# Patient Record
Sex: FEMALE | Race: WHITE | NOT HISPANIC OR LATINO | Employment: OTHER | URBAN - NONMETROPOLITAN AREA
[De-identification: names, ages, dates, MRNs, and addresses within clinical notes are randomized per-mention and may not be internally consistent; named-entity substitution may affect disease eponyms.]

---

## 2018-06-20 ENCOUNTER — TRANSCRIBE ORDERS (OUTPATIENT)
Dept: ADMINISTRATIVE | Facility: HOSPITAL | Age: 73
End: 2018-06-20

## 2018-06-20 DIAGNOSIS — Z00.00 ROUTINE GENERAL MEDICAL EXAMINATION AT A HEALTH CARE FACILITY: Primary | ICD-10-CM

## 2018-06-21 ENCOUNTER — TRANSCRIBE ORDERS (OUTPATIENT)
Dept: ADMINISTRATIVE | Facility: HOSPITAL | Age: 73
End: 2018-06-21

## 2018-06-21 DIAGNOSIS — F17.200 TOBACCO USE DISORDER: Primary | ICD-10-CM

## 2018-06-27 ENCOUNTER — APPOINTMENT (OUTPATIENT)
Dept: BONE DENSITY | Facility: HOSPITAL | Age: 73
End: 2018-06-27

## 2018-06-27 ENCOUNTER — HOSPITAL ENCOUNTER (OUTPATIENT)
Dept: CT IMAGING | Facility: HOSPITAL | Age: 73
Discharge: HOME | End: 2018-06-27
Admitting: FAMILY MEDICINE

## 2018-06-27 DIAGNOSIS — F17.200 TOBACCO USE DISORDER: ICD-10-CM

## 2018-06-27 DIAGNOSIS — Z00.00 ROUTINE GENERAL MEDICAL EXAMINATION AT A HEALTH CARE FACILITY: ICD-10-CM

## 2018-06-27 LAB
CREAT BLD-MCNC: 0.7 MG/DL (ref 0.6–1.3)
GFR SERPL CREATININE-BSD FRML MDRD: 82 ML/MIN/1.73

## 2018-06-27 PROCEDURE — 82565 ASSAY OF CREATININE: CPT | Performed by: RADIOLOGY

## 2018-06-27 PROCEDURE — 77080 DXA BONE DENSITY AXIAL: CPT

## 2018-06-27 PROCEDURE — 25010000002 IOPAMIDOL 61 % SOLUTION: Performed by: FAMILY MEDICINE

## 2018-06-27 PROCEDURE — 71260 CT THORAX DX C+: CPT

## 2018-06-27 RX ADMIN — IOPAMIDOL 100 ML: 612 INJECTION, SOLUTION INTRAVENOUS at 14:30

## 2019-01-02 ENCOUNTER — HOSPITAL ENCOUNTER (EMERGENCY)
Facility: HOSPITAL | Age: 74
Discharge: HOME | End: 2019-01-02
Attending: EMERGENCY MEDICINE | Admitting: EMERGENCY MEDICINE

## 2019-01-02 ENCOUNTER — APPOINTMENT (OUTPATIENT)
Dept: GENERAL RADIOLOGY | Facility: HOSPITAL | Age: 74
End: 2019-01-02

## 2019-01-02 VITALS
RESPIRATION RATE: 18 BRPM | WEIGHT: 132 LBS | OXYGEN SATURATION: 90 % | HEIGHT: 66 IN | BODY MASS INDEX: 21.21 KG/M2 | SYSTOLIC BLOOD PRESSURE: 156 MMHG | TEMPERATURE: 97.5 F | DIASTOLIC BLOOD PRESSURE: 76 MMHG | HEART RATE: 82 BPM

## 2019-01-02 DIAGNOSIS — J44.1 COPD EXACERBATION (HCC): Primary | ICD-10-CM

## 2019-01-02 LAB
ALBUMIN SERPL-MCNC: 4.7 G/DL (ref 3.5–5)
ALBUMIN/GLOB SERPL: 1.6 G/DL (ref 1–2)
ALP SERPL-CCNC: 77 U/L (ref 38–126)
ALT SERPL W P-5'-P-CCNC: 23 U/L (ref 13–69)
ANION GAP SERPL CALCULATED.3IONS-SCNC: 13.2 MMOL/L (ref 10–20)
AST SERPL-CCNC: 21 U/L (ref 15–46)
BASOPHILS # BLD AUTO: 0.12 10*3/MM3 (ref 0–0.2)
BASOPHILS NFR BLD AUTO: 1.1 % (ref 0–2.5)
BILIRUB SERPL-MCNC: 0.6 MG/DL (ref 0.2–1.3)
BUN BLD-MCNC: 7 MG/DL (ref 7–20)
BUN/CREAT SERPL: 10 (ref 7.1–23.5)
CALCIUM SPEC-SCNC: 9.5 MG/DL (ref 8.4–10.2)
CHLORIDE SERPL-SCNC: 98 MMOL/L (ref 98–107)
CO2 SERPL-SCNC: 28 MMOL/L (ref 26–30)
CREAT BLD-MCNC: 0.7 MG/DL (ref 0.6–1.3)
DEPRECATED RDW RBC AUTO: 41 FL (ref 37–54)
EOSINOPHIL # BLD AUTO: 0.86 10*3/MM3 (ref 0–0.7)
EOSINOPHIL NFR BLD AUTO: 8 % (ref 0–7)
ERYTHROCYTE [DISTWIDTH] IN BLOOD BY AUTOMATED COUNT: 12.7 % (ref 11.5–14.5)
GFR SERPL CREATININE-BSD FRML MDRD: 82 ML/MIN/1.73
GLOBULIN UR ELPH-MCNC: 2.9 GM/DL
GLUCOSE BLD-MCNC: 101 MG/DL (ref 74–98)
HCT VFR BLD AUTO: 42.5 % (ref 37–47)
HGB BLD-MCNC: 14.6 G/DL (ref 12–16)
HOLD SPECIMEN: NORMAL
HOLD SPECIMEN: NORMAL
IMM GRANULOCYTES # BLD AUTO: 0.04 10*3/MM3 (ref 0–0.06)
IMM GRANULOCYTES NFR BLD AUTO: 0.4 % (ref 0–0.6)
LYMPHOCYTES # BLD AUTO: 3.51 10*3/MM3 (ref 0.6–3.4)
LYMPHOCYTES NFR BLD AUTO: 32.6 % (ref 10–50)
MCH RBC QN AUTO: 30.5 PG (ref 27–31)
MCHC RBC AUTO-ENTMCNC: 34.4 G/DL (ref 30–37)
MCV RBC AUTO: 88.7 FL (ref 81–99)
MONOCYTES # BLD AUTO: 0.81 10*3/MM3 (ref 0–0.9)
MONOCYTES NFR BLD AUTO: 7.5 % (ref 0–12)
NEUTROPHILS # BLD AUTO: 5.43 10*3/MM3 (ref 2–6.9)
NEUTROPHILS NFR BLD AUTO: 50.4 % (ref 37–80)
NRBC BLD AUTO-RTO: 0 /100 WBC (ref 0–0)
NT-PROBNP SERPL-MCNC: 1540 PG/ML (ref 0–125)
PLATELET # BLD AUTO: 376 10*3/MM3 (ref 130–400)
PMV BLD AUTO: 10.1 FL (ref 6–12)
POTASSIUM BLD-SCNC: 4.2 MMOL/L (ref 3.5–5.1)
PROT SERPL-MCNC: 7.6 G/DL (ref 6.3–8.2)
RBC # BLD AUTO: 4.79 10*6/MM3 (ref 4.2–5.4)
SODIUM BLD-SCNC: 135 MMOL/L (ref 137–145)
TROPONIN I SERPL-MCNC: 0.02 NG/ML (ref 0–0.03)
WBC NRBC COR # BLD: 10.77 10*3/MM3 (ref 4.8–10.8)
WHOLE BLOOD HOLD SPECIMEN: NORMAL
WHOLE BLOOD HOLD SPECIMEN: NORMAL

## 2019-01-02 PROCEDURE — 94640 AIRWAY INHALATION TREATMENT: CPT

## 2019-01-02 PROCEDURE — 99283 EMERGENCY DEPT VISIT LOW MDM: CPT

## 2019-01-02 PROCEDURE — 84484 ASSAY OF TROPONIN QUANT: CPT | Performed by: EMERGENCY MEDICINE

## 2019-01-02 PROCEDURE — 96374 THER/PROPH/DIAG INJ IV PUSH: CPT

## 2019-01-02 PROCEDURE — 85025 COMPLETE CBC W/AUTO DIFF WBC: CPT | Performed by: EMERGENCY MEDICINE

## 2019-01-02 PROCEDURE — 71046 X-RAY EXAM CHEST 2 VIEWS: CPT

## 2019-01-02 PROCEDURE — 94799 UNLISTED PULMONARY SVC/PX: CPT

## 2019-01-02 PROCEDURE — 83880 ASSAY OF NATRIURETIC PEPTIDE: CPT | Performed by: EMERGENCY MEDICINE

## 2019-01-02 PROCEDURE — 80053 COMPREHEN METABOLIC PANEL: CPT | Performed by: EMERGENCY MEDICINE

## 2019-01-02 PROCEDURE — 93005 ELECTROCARDIOGRAM TRACING: CPT | Performed by: EMERGENCY MEDICINE

## 2019-01-02 PROCEDURE — 25010000002 METHYLPREDNISOLONE PER 40 MG: Performed by: EMERGENCY MEDICINE

## 2019-01-02 RX ORDER — SODIUM CHLORIDE 0.9 % (FLUSH) 0.9 %
10 SYRINGE (ML) INJECTION AS NEEDED
Status: DISCONTINUED | OUTPATIENT
Start: 2019-01-02 | End: 2019-01-02 | Stop reason: HOSPADM

## 2019-01-02 RX ORDER — PREDNISONE 20 MG/1
40 TABLET ORAL DAILY
Qty: 8 TABLET | Refills: 0 | Status: SHIPPED | OUTPATIENT
Start: 2019-01-02 | End: 2019-01-06

## 2019-01-02 RX ORDER — METHYLPREDNISOLONE SODIUM SUCCINATE 40 MG/ML
80 INJECTION, POWDER, LYOPHILIZED, FOR SOLUTION INTRAMUSCULAR; INTRAVENOUS ONCE
Status: COMPLETED | OUTPATIENT
Start: 2019-01-02 | End: 2019-01-02

## 2019-01-02 RX ORDER — BENZONATATE 100 MG/1
100 CAPSULE ORAL ONCE
Status: COMPLETED | OUTPATIENT
Start: 2019-01-02 | End: 2019-01-02

## 2019-01-02 RX ORDER — BENZONATATE 100 MG/1
100 CAPSULE ORAL 3 TIMES DAILY PRN
Qty: 15 CAPSULE | Refills: 0 | Status: SHIPPED | OUTPATIENT
Start: 2019-01-02

## 2019-01-02 RX ORDER — IPRATROPIUM BROMIDE AND ALBUTEROL SULFATE 2.5; .5 MG/3ML; MG/3ML
3 SOLUTION RESPIRATORY (INHALATION) ONCE
Status: COMPLETED | OUTPATIENT
Start: 2019-01-02 | End: 2019-01-02

## 2019-01-02 RX ADMIN — IPRATROPIUM BROMIDE AND ALBUTEROL SULFATE 3 ML: .5; 3 SOLUTION RESPIRATORY (INHALATION) at 18:09

## 2019-01-02 RX ADMIN — METHYLPREDNISOLONE SODIUM SUCCINATE 80 MG: 40 INJECTION, POWDER, FOR SOLUTION INTRAMUSCULAR; INTRAVENOUS at 16:49

## 2019-01-02 RX ADMIN — BENZONATATE 100 MG: 100 CAPSULE, LIQUID FILLED ORAL at 18:47

## 2019-01-02 NOTE — ED PROVIDER NOTES
Subjective   History of Present Illness   73F w/ hx of COPD p/w cough. Pt reports that she had some sinus pressure and congestion 2 weeks ago that has been improving on amoxicillin.  However, she feels as though this is gone into her chest and she has had worsening cough over the last 3 days.  This is worse at night.  She has been using her nebulizer which helps for a couple of hours but then wears off.  Denies any fevers, chills, chest pain, leg swelling, or other specific symptoms.  Not currently on any steroids.    Review of Systems   Respiratory: Positive for cough.    All other systems reviewed and are negative.      Past Medical History:   Diagnosis Date   • COPD (chronic obstructive pulmonary disease) (CMS/[facility])        No Known Allergies    Past Surgical History:   Procedure Laterality Date   • CARPAL TUNNEL RELEASE     • HYSTERECTOMY     • SHOULDER SURGERY     • TRIGGER FINGER RELEASE         History reviewed. No pertinent family history.    Social History     Socioeconomic History   • Marital status: Unknown     Spouse name: Not on file   • Number of children: Not on file   • Years of education: Not on file   • Highest education level: Not on file   Tobacco Use   • Smoking status: Current Every Day Smoker     Types: Cigarettes   Substance and Sexual Activity   • Alcohol use: No     Frequency: Never   • Drug use: No           Objective   Physical Exam   Constitutional: She is oriented to person, place, and time. She appears well-developed and well-nourished.  Non-toxic appearance. She does not appear ill. No distress.   HENT:   Head: Normocephalic.   Mouth/Throat: Oropharynx is clear and moist. No oropharyngeal exudate.   Eyes: Conjunctivae are normal. No scleral icterus.   Neck: Neck supple. No tracheal deviation present.   Cardiovascular: Normal rate, regular rhythm, normal heart sounds and intact distal pulses. Exam reveals no gallop and no friction rub.   No murmur heard.  Pulmonary/Chest: Effort normal.  No stridor. No respiratory distress. She has wheezes (faint end-expiratory). She has no rales. She exhibits no tenderness.   Abdominal: Soft. She exhibits no distension and no mass. There is no tenderness. There is no rebound and no guarding. No hernia.   Musculoskeletal: She exhibits no edema or deformity.   Neurological: She is alert and oriented to person, place, and time.   Skin: Skin is warm and dry. She is not diaphoretic. No erythema. No pallor.   Psychiatric: She has a normal mood and affect. Her behavior is normal.   Nursing note and vitals reviewed.      Procedures           ED Course  ED Course as of 1840   Wed 2019   1709 EKst AVB, NSR w/ HR 76, single PVC, low voltage. No ANGEL?STD. TWI in lead aVL. 0.5mm ANGEL in V5 only. Abnormal EKG. Does not meet STEMI criteria. Interpreted by me.   [AI]      ED Course User Index  [AI] Lorenzo Moffett MD                  [facility]  73-year-old female here with worsening cough for the last 3 days.  Afebrile, vital signs stable.  Lungs sound clear with very faint end expiratory wheezing.  Suspect likely COPD exacerbation versus postviral cough.  We'll send labs, chest x-ray, EKG to evaluate for other causes but these are reassuring, will discharge home on a short course of steroids.    6:40 PM labs show slightly elevated pro BNP.  Otherwise reassuring.  Chest x-ray appears clear.  Patient is remained O2 sat over 80% throughout her stay here.  I suspect that this is likely a COPD exacerbation and will prescribe her Tessalon Perles, steroids, and give strict return to care precautions.  She does have home oxygen already and I encouraged her to use this throughout the day and at night.  Discussed importance of follow-up within the next 1-2 days with her primary care physician.    Final diagnoses:   COPD exacerbation (CMS/[facility])            Lorenzo Moffett MD  19

## 2019-01-13 ENCOUNTER — APPOINTMENT (OUTPATIENT)
Dept: GENERAL RADIOLOGY | Facility: HOSPITAL | Age: 74
End: 2019-01-13

## 2019-01-13 ENCOUNTER — HOSPITAL ENCOUNTER (INPATIENT)
Facility: HOSPITAL | Age: 74
LOS: 4 days | Discharge: ANOTHER HOSPITAL | End: 2019-01-17
Attending: EMERGENCY MEDICINE | Admitting: INTERNAL MEDICINE

## 2019-01-13 DIAGNOSIS — I21.9 MYOCARDIAL INFARCTION, UNSPECIFIED MI TYPE, UNSPECIFIED ARTERY (HCC): Primary | ICD-10-CM

## 2019-01-13 DIAGNOSIS — I21.4 NON-ST ELEVATION (NSTEMI) MYOCARDIAL INFARCTION (HCC): ICD-10-CM

## 2019-01-13 DIAGNOSIS — I50.9 HEART FAILURE, UNSPECIFIED HF CHRONICITY, UNSPECIFIED HEART FAILURE TYPE (HCC): ICD-10-CM

## 2019-01-13 LAB
A-A DO2: ABNORMAL MMHG
ALBUMIN SERPL-MCNC: 4.4 G/DL (ref 3.5–5)
ALBUMIN/GLOB SERPL: 1.6 G/DL (ref 1–2)
ALP SERPL-CCNC: 74 U/L (ref 38–126)
ALT SERPL W P-5'-P-CCNC: 24 U/L (ref 13–69)
ANION GAP SERPL CALCULATED.3IONS-SCNC: 11.2 MMOL/L (ref 10–20)
ARTERIAL PATENCY WRIST A: POSITIVE
AST SERPL-CCNC: 21 U/L (ref 15–46)
ATMOSPHERIC PRESS: 735 MMHG
BACTERIA UR QL AUTO: ABNORMAL /HPF
BASE EXCESS BLDA CALC-SCNC: 2.8 MMOL/L (ref 0–2)
BASOPHILS # BLD AUTO: 0.09 10*3/MM3 (ref 0–0.2)
BASOPHILS NFR BLD AUTO: 1 % (ref 0–2.5)
BDY SITE: ABNORMAL
BILIRUB SERPL-MCNC: 0.8 MG/DL (ref 0.2–1.3)
BILIRUB UR QL STRIP: NEGATIVE
BUN BLD-MCNC: 8 MG/DL (ref 7–20)
BUN/CREAT SERPL: 11.4 (ref 7.1–23.5)
CALCIUM SPEC-SCNC: 8.9 MG/DL (ref 8.4–10.2)
CHLORIDE SERPL-SCNC: 98 MMOL/L (ref 98–107)
CHOLEST SERPL-MCNC: 176 MG/DL (ref 0–199)
CLARITY UR: CLEAR
CO2 SERPL-SCNC: 29 MMOL/L (ref 26–30)
COHGB MFR BLD: 0.8 % (ref 0–2)
COLOR UR: YELLOW
CREAT BLD-MCNC: 0.7 MG/DL (ref 0.6–1.3)
DEPRECATED RDW RBC AUTO: 44.3 FL (ref 37–54)
EOSINOPHIL # BLD AUTO: 0.78 10*3/MM3 (ref 0–0.7)
EOSINOPHIL NFR BLD AUTO: 8.7 % (ref 0–7)
ERYTHROCYTE [DISTWIDTH] IN BLOOD BY AUTOMATED COUNT: 13 % (ref 11.5–14.5)
GAS FLOW AIRWAY: 3 LPM
GFR SERPL CREATININE-BSD FRML MDRD: 82 ML/MIN/1.73
GLOBULIN UR ELPH-MCNC: 2.8 GM/DL
GLUCOSE BLD-MCNC: 143 MG/DL (ref 74–98)
GLUCOSE UR STRIP-MCNC: NEGATIVE MG/DL
HCO3 BLDA-SCNC: 28.5 MMOL/L (ref 22–28)
HCT VFR BLD AUTO: 43.5 % (ref 37–47)
HCT VFR BLD CALC: 44 %
HDLC SERPL-MCNC: 58 MG/DL (ref 40–60)
HGB BLD-MCNC: 14.2 G/DL (ref 12–16)
HGB BLDA-MCNC: 14.3 G/DL (ref 12–18)
HGB UR QL STRIP.AUTO: NEGATIVE
HIGH SENSITIVE C-REACTIVE PROTEIN MG/L: 6.12 MG/L (ref 1–3)
HOLD SPECIMEN: NORMAL
HOLD SPECIMEN: NORMAL
HOROWITZ INDEX BLD+IHG-RTO: 32 %
HYALINE CASTS UR QL AUTO: ABNORMAL /LPF
IMM GRANULOCYTES # BLD AUTO: 0.05 10*3/MM3 (ref 0–0.06)
IMM GRANULOCYTES NFR BLD AUTO: 0.6 % (ref 0–0.6)
KETONES UR QL STRIP: ABNORMAL
LDLC SERPL CALC-MCNC: 103 MG/DL (ref 0–99)
LDLC/HDLC SERPL: 1.78 {RATIO}
LEUKOCYTE ESTERASE UR QL STRIP.AUTO: ABNORMAL
LYMPHOCYTES # BLD AUTO: 2.36 10*3/MM3 (ref 0.6–3.4)
LYMPHOCYTES NFR BLD AUTO: 26.3 % (ref 10–50)
MCH RBC QN AUTO: 30.3 PG (ref 27–31)
MCHC RBC AUTO-ENTMCNC: 32.6 G/DL (ref 30–37)
MCV RBC AUTO: 92.9 FL (ref 81–99)
METHGB BLD QL: 0.6 % (ref 0–1.5)
MODALITY: ABNORMAL
MONOCYTES # BLD AUTO: 0.79 10*3/MM3 (ref 0–0.9)
MONOCYTES NFR BLD AUTO: 8.8 % (ref 0–12)
NEUTROPHILS # BLD AUTO: 4.9 10*3/MM3 (ref 2–6.9)
NEUTROPHILS NFR BLD AUTO: 54.6 % (ref 37–80)
NITRITE UR QL STRIP: NEGATIVE
NOTE: ABNORMAL
NRBC BLD AUTO-RTO: 0 /100 WBC (ref 0–0)
NT-PROBNP SERPL-MCNC: 1110 PG/ML (ref 0–125)
OXYHGB MFR BLDV: 93.6 % (ref 94–99)
PCO2 BLDA: 46.7 MM HG (ref 35–45)
PCO2 TEMP ADJ BLD: ABNORMAL MM HG (ref 35–45)
PH BLDA: 7.39 PH UNITS (ref 7.3–7.5)
PH UR STRIP.AUTO: 7 [PH] (ref 5–8)
PH, TEMP CORRECTED: ABNORMAL PH UNITS
PLATELET # BLD AUTO: 353 10*3/MM3 (ref 130–400)
PMV BLD AUTO: 9.9 FL (ref 6–12)
PO2 BLDA: 71.3 MM HG (ref 75–100)
PO2 TEMP ADJ BLD: ABNORMAL MM HG (ref 83–108)
POTASSIUM BLD-SCNC: 4.2 MMOL/L (ref 3.5–5.1)
PROCALCITONIN SERPL-MCNC: <0.05 NG/ML
PROT SERPL-MCNC: 7.2 G/DL (ref 6.3–8.2)
PROT UR QL STRIP: NEGATIVE
RBC # BLD AUTO: 4.68 10*6/MM3 (ref 4.2–5.4)
RBC # UR: ABNORMAL /HPF
REF LAB TEST METHOD: ABNORMAL
SAO2 % BLDCOA: 94.9 % (ref 94–100)
SODIUM BLD-SCNC: 134 MMOL/L (ref 137–145)
SP GR UR STRIP: 1.01 (ref 1–1.03)
SQUAMOUS #/AREA URNS HPF: ABNORMAL /HPF
T4 FREE SERPL-MCNC: 1.34 NG/DL (ref 0.78–2.19)
TRIGL SERPL-MCNC: 75 MG/DL
TROPONIN I SERPL-MCNC: 0.02 NG/ML (ref 0–0.05)
TROPONIN I SERPL-MCNC: 0.02 NG/ML (ref 0–0.05)
TSH SERPL DL<=0.05 MIU/L-ACNC: 4.49 MIU/ML (ref 0.47–4.68)
UROBILINOGEN UR QL STRIP: ABNORMAL
VENTILATOR MODE: ABNORMAL
VLDLC SERPL-MCNC: 15 MG/DL
WBC NRBC COR # BLD: 8.97 10*3/MM3 (ref 4.8–10.8)
WBC UR QL AUTO: ABNORMAL /HPF
WHOLE BLOOD HOLD SPECIMEN: NORMAL
WHOLE BLOOD HOLD SPECIMEN: NORMAL

## 2019-01-13 PROCEDURE — 84484 ASSAY OF TROPONIN QUANT: CPT | Performed by: EMERGENCY MEDICINE

## 2019-01-13 PROCEDURE — 94640 AIRWAY INHALATION TREATMENT: CPT

## 2019-01-13 PROCEDURE — 25010000002 FUROSEMIDE PER 20 MG: Performed by: INTERNAL MEDICINE

## 2019-01-13 PROCEDURE — 94799 UNLISTED PULMONARY SVC/PX: CPT

## 2019-01-13 PROCEDURE — 84443 ASSAY THYROID STIM HORMONE: CPT | Performed by: INTERNAL MEDICINE

## 2019-01-13 PROCEDURE — 81001 URINALYSIS AUTO W/SCOPE: CPT | Performed by: EMERGENCY MEDICINE

## 2019-01-13 PROCEDURE — 86141 C-REACTIVE PROTEIN HS: CPT | Performed by: INTERNAL MEDICINE

## 2019-01-13 PROCEDURE — 83036 HEMOGLOBIN GLYCOSYLATED A1C: CPT | Performed by: INTERNAL MEDICINE

## 2019-01-13 PROCEDURE — 99285 EMERGENCY DEPT VISIT HI MDM: CPT

## 2019-01-13 PROCEDURE — 83050 HGB METHEMOGLOBIN QUAN: CPT

## 2019-01-13 PROCEDURE — 63710000001 PREDNISONE PER 1 MG: Performed by: INTERNAL MEDICINE

## 2019-01-13 PROCEDURE — 82805 BLOOD GASES W/O2 SATURATION: CPT

## 2019-01-13 PROCEDURE — 25010000002 METHYLPREDNISOLONE PER 125 MG: Performed by: EMERGENCY MEDICINE

## 2019-01-13 PROCEDURE — 93005 ELECTROCARDIOGRAM TRACING: CPT | Performed by: EMERGENCY MEDICINE

## 2019-01-13 PROCEDURE — 84439 ASSAY OF FREE THYROXINE: CPT | Performed by: INTERNAL MEDICINE

## 2019-01-13 PROCEDURE — 85025 COMPLETE CBC W/AUTO DIFF WBC: CPT | Performed by: EMERGENCY MEDICINE

## 2019-01-13 PROCEDURE — 84145 PROCALCITONIN (PCT): CPT | Performed by: INTERNAL MEDICINE

## 2019-01-13 PROCEDURE — 83880 ASSAY OF NATRIURETIC PEPTIDE: CPT | Performed by: EMERGENCY MEDICINE

## 2019-01-13 PROCEDURE — 80053 COMPREHEN METABOLIC PANEL: CPT | Performed by: EMERGENCY MEDICINE

## 2019-01-13 PROCEDURE — 80061 LIPID PANEL: CPT | Performed by: INTERNAL MEDICINE

## 2019-01-13 PROCEDURE — 82375 ASSAY CARBOXYHB QUANT: CPT

## 2019-01-13 PROCEDURE — 25010000002 ENOXAPARIN PER 10 MG: Performed by: INTERNAL MEDICINE

## 2019-01-13 PROCEDURE — 71046 X-RAY EXAM CHEST 2 VIEWS: CPT

## 2019-01-13 PROCEDURE — 36415 COLL VENOUS BLD VENIPUNCTURE: CPT | Performed by: EMERGENCY MEDICINE

## 2019-01-13 RX ORDER — IPRATROPIUM BROMIDE AND ALBUTEROL SULFATE 2.5; .5 MG/3ML; MG/3ML
3 SOLUTION RESPIRATORY (INHALATION)
Status: DISCONTINUED | OUTPATIENT
Start: 2019-01-13 | End: 2019-01-17 | Stop reason: HOSPADM

## 2019-01-13 RX ORDER — LOSARTAN POTASSIUM 50 MG/1
50 TABLET ORAL
Status: DISCONTINUED | OUTPATIENT
Start: 2019-01-13 | End: 2019-01-15 | Stop reason: SDUPTHER

## 2019-01-13 RX ORDER — CARVEDILOL 3.12 MG/1
3.12 TABLET ORAL 2 TIMES DAILY WITH MEALS
COMMUNITY
End: 2019-01-17 | Stop reason: HOSPADM

## 2019-01-13 RX ORDER — METOPROLOL SUCCINATE 25 MG/1
25 TABLET, EXTENDED RELEASE ORAL
Status: DISCONTINUED | OUTPATIENT
Start: 2019-01-13 | End: 2019-01-17 | Stop reason: HOSPADM

## 2019-01-13 RX ORDER — FUROSEMIDE 10 MG/ML
20 INJECTION INTRAMUSCULAR; INTRAVENOUS EVERY 12 HOURS SCHEDULED
Status: DISCONTINUED | OUTPATIENT
Start: 2019-01-13 | End: 2019-01-14

## 2019-01-13 RX ORDER — CETIRIZINE HYDROCHLORIDE 10 MG/1
10 TABLET ORAL DAILY
COMMUNITY

## 2019-01-13 RX ORDER — ASPIRIN 81 MG/1
81 TABLET ORAL DAILY
Status: DISCONTINUED | OUTPATIENT
Start: 2019-01-13 | End: 2019-01-17 | Stop reason: HOSPADM

## 2019-01-13 RX ORDER — ATORVASTATIN CALCIUM 80 MG/1
80 TABLET, FILM COATED ORAL NIGHTLY
Status: DISCONTINUED | OUTPATIENT
Start: 2019-01-13 | End: 2019-01-17 | Stop reason: HOSPADM

## 2019-01-13 RX ORDER — SPIRONOLACTONE 25 MG/1
25 TABLET ORAL DAILY
Status: DISCONTINUED | OUTPATIENT
Start: 2019-01-13 | End: 2019-01-17 | Stop reason: HOSPADM

## 2019-01-13 RX ORDER — CLOPIDOGREL BISULFATE 75 MG/1
300 TABLET ORAL ONCE
Status: COMPLETED | OUTPATIENT
Start: 2019-01-13 | End: 2019-01-13

## 2019-01-13 RX ORDER — SODIUM CHLORIDE 0.9 % (FLUSH) 0.9 %
10 SYRINGE (ML) INJECTION AS NEEDED
Status: DISCONTINUED | OUTPATIENT
Start: 2019-01-13 | End: 2019-01-17 | Stop reason: HOSPADM

## 2019-01-13 RX ORDER — PREDNISONE 20 MG/1
40 TABLET ORAL DAILY
Status: DISCONTINUED | OUTPATIENT
Start: 2019-01-13 | End: 2019-01-17 | Stop reason: HOSPADM

## 2019-01-13 RX ORDER — FLUTICASONE PROPIONATE 50 MCG
2 SPRAY, SUSPENSION (ML) NASAL DAILY
COMMUNITY

## 2019-01-13 RX ORDER — NICOTINE 21 MG/24HR
1 PATCH, TRANSDERMAL 24 HOURS TRANSDERMAL EVERY 24 HOURS
Status: DISCONTINUED | OUTPATIENT
Start: 2019-01-13 | End: 2019-01-14

## 2019-01-13 RX ORDER — CLOPIDOGREL BISULFATE 75 MG/1
75 TABLET ORAL DAILY
Status: DISCONTINUED | OUTPATIENT
Start: 2019-01-14 | End: 2019-01-17 | Stop reason: HOSPADM

## 2019-01-13 RX ORDER — MONTELUKAST SODIUM 10 MG/1
10 TABLET ORAL NIGHTLY
COMMUNITY

## 2019-01-13 RX ORDER — METHYLPREDNISOLONE SODIUM SUCCINATE 125 MG/2ML
125 INJECTION, POWDER, LYOPHILIZED, FOR SOLUTION INTRAMUSCULAR; INTRAVENOUS ONCE
Status: COMPLETED | OUTPATIENT
Start: 2019-01-13 | End: 2019-01-13

## 2019-01-13 RX ORDER — IPRATROPIUM BROMIDE AND ALBUTEROL SULFATE 2.5; .5 MG/3ML; MG/3ML
3 SOLUTION RESPIRATORY (INHALATION) ONCE
Status: COMPLETED | OUTPATIENT
Start: 2019-01-13 | End: 2019-01-13

## 2019-01-13 RX ORDER — IPRATROPIUM BROMIDE AND ALBUTEROL SULFATE 2.5; .5 MG/3ML; MG/3ML
3 SOLUTION RESPIRATORY (INHALATION) EVERY 4 HOURS PRN
COMMUNITY

## 2019-01-13 RX ORDER — AMLODIPINE BESYLATE 5 MG/1
5 TABLET ORAL
Status: DISCONTINUED | OUTPATIENT
Start: 2019-01-13 | End: 2019-01-17 | Stop reason: HOSPADM

## 2019-01-13 RX ORDER — FAMOTIDINE 20 MG/1
20 TABLET, FILM COATED ORAL 2 TIMES DAILY
Status: DISCONTINUED | OUTPATIENT
Start: 2019-01-13 | End: 2019-01-14

## 2019-01-13 RX ADMIN — SODIUM CHLORIDE, PRESERVATIVE FREE 10 ML: 5 INJECTION INTRAVENOUS at 20:52

## 2019-01-13 RX ADMIN — FAMOTIDINE 20 MG: 20 TABLET ORAL at 20:51

## 2019-01-13 RX ADMIN — FUROSEMIDE 20 MG: 10 INJECTION, SOLUTION INTRAMUSCULAR; INTRAVENOUS at 18:59

## 2019-01-13 RX ADMIN — ASPIRIN 81 MG: 81 TABLET, COATED ORAL at 12:37

## 2019-01-13 RX ADMIN — METHYLPREDNISOLONE SODIUM SUCCINATE 125 MG: 125 INJECTION, POWDER, FOR SOLUTION INTRAMUSCULAR; INTRAVENOUS at 07:58

## 2019-01-13 RX ADMIN — IPRATROPIUM BROMIDE AND ALBUTEROL SULFATE 3 ML: .5; 3 SOLUTION RESPIRATORY (INHALATION) at 16:25

## 2019-01-13 RX ADMIN — ATORVASTATIN CALCIUM 80 MG: 80 TABLET, FILM COATED ORAL at 20:52

## 2019-01-13 RX ADMIN — IPRATROPIUM BROMIDE AND ALBUTEROL SULFATE 3 ML: .5; 3 SOLUTION RESPIRATORY (INHALATION) at 08:02

## 2019-01-13 RX ADMIN — NICOTINE 1 PATCH: 21 PATCH TRANSDERMAL at 12:37

## 2019-01-13 RX ADMIN — FUROSEMIDE 20 MG: 10 INJECTION, SOLUTION INTRAMUSCULAR; INTRAVENOUS at 12:37

## 2019-01-13 RX ADMIN — METOPROLOL SUCCINATE 25 MG: 25 TABLET, EXTENDED RELEASE ORAL at 12:37

## 2019-01-13 RX ADMIN — FAMOTIDINE 20 MG: 20 TABLET ORAL at 12:37

## 2019-01-13 RX ADMIN — ENOXAPARIN SODIUM 40 MG: 40 INJECTION SUBCUTANEOUS at 12:36

## 2019-01-13 RX ADMIN — LOSARTAN POTASSIUM 50 MG: 50 TABLET, FILM COATED ORAL at 14:02

## 2019-01-13 RX ADMIN — SPIRONOLACTONE 25 MG: 25 TABLET, FILM COATED ORAL at 14:02

## 2019-01-13 RX ADMIN — PREDNISONE 40 MG: 20 TABLET ORAL at 12:37

## 2019-01-13 RX ADMIN — IPRATROPIUM BROMIDE AND ALBUTEROL SULFATE 3 ML: .5; 3 SOLUTION RESPIRATORY (INHALATION) at 19:34

## 2019-01-13 RX ADMIN — CLOPIDOGREL BISULFATE 300 MG: 75 TABLET ORAL at 12:37

## 2019-01-13 NOTE — ED PROVIDER NOTES
TRIAGE CHIEF COMPLAINT:     Nursing and triage notes reviewed    Chief Complaint   Patient presents with   • Shortness of Breath      HPI: Ijeoma Palacios is a 73 y.o. female who presents to the emergency department complaining of shortness of breath.  Has a history of COPD and is on 2 L of oxygen all the time.  Patient states for the past several nights she has been more and more short of breath.  Patient states that for the past 3 nights she has not been able to sleep at night because she can't lay down or breath.  He has been coughing some has not had much sputum production.  She denies fevers or chills.  She denies having any chest pain.  Denies any history of coronary issues.  She denies nausea or vomiting.    REVIEW OF SYSTEMS: All other systems reviewed and are negative     PAST MEDICAL HISTORY:   Past Medical History:   Diagnosis Date   • COPD (chronic obstructive pulmonary disease) (CMS/[facility])         FAMILY HISTORY:   History reviewed. No pertinent family history.     SOCIAL HISTORY:   Social History     Socioeconomic History   • Marital status: Unknown     Spouse name: Not on file   • Number of children: Not on file   • Years of education: Not on file   • Highest education level: Not on file   Social Needs   • Financial resource strain: Not on file   • Food insecurity - worry: Not on file   • Food insecurity - inability: Not on file   • Transportation needs - medical: Not on file   • Transportation needs - non-medical: Not on file   Occupational History   • Not on file   Tobacco Use   • Smoking status: Current Every Day Smoker     Types: Cigarettes   Substance and Sexual Activity   • Alcohol use: No     Frequency: Never   • Drug use: No   • Sexual activity: Not on file   Other Topics Concern   • Not on file   Social History Narrative   • Not on file        SURGICAL HISTORY:   Past Surgical History:   Procedure Laterality Date   • CARPAL TUNNEL RELEASE     • HYSTERECTOMY     • SHOULDER SURGERY     • TRIGGER  FINGER RELEASE          CURRENT MEDICATIONS:      Medication List      ASK your doctor about these medications    benzonatate 100 MG capsule  Commonly known as:  TESSALON  Take 1 capsule by mouth 3 (Three) Times a Day As Needed for Cough.           ALLERGIES: Patient has no known allergies.     PHYSICAL EXAM:   VITAL SIGNS:   Vitals:    01/13/19 0714   BP:    Pulse: 85   Resp:    Temp:    SpO2: 96%      CONSTITUTIONAL: Awake, oriented, appears non-toxic   HENT: Atraumatic, normocephalic, oral mucosa pink and moist, airway patent   EYES: Conjunctiva clear   NECK: Trachea midline   CARDIOVASCULAR: Normal heart rate, Normal rhythm, No murmurs, rubs, gallops   PULMONARY/CHEST: Some mildly decreased air movement with wheezes primarily in the lower lobes..   ABDOMINAL: Non-distended, soft, non-tender - no rebound or guarding.  NEUROLOGIC: Non-focal, moving all four extremities, no gross sensory or motor deficits.   EXTREMITIES: No clubbing, cyanosis, or edema   SKIN: Warm, Dry, No erythema, No rash     ED COURSE / MEDICAL DECISION MAKING:   Ijeoma Palacios is a 73 y.o. female who presents to the emergency department for evaluation of shortness of breath.  Patient is nondistressed on arrival in the emergency department.  Patient is breathing comfortably on her home dose of oxygen.  We will obtain EKG, x-ray, labs for further evaluation.  Patient given breathing treatment and steroids on arrival.  Patient's EKG was interpreted by me which revealed sinus rhythm with rate of 92 bpm.  There were some new Q waves in lead V5 when compared to patient's previous EKG approximately 2 weeks ago.  Abnormal appearing EKG.  Patient's chest x-ray does not appear any different than previous x-ray.  Cardiac enzymes within normal range.  I did the cardiologist on-call about the patient.  He came to evaluate and did a bedside echocardiogram.  Has some concern for heart failure secondary to a myocardial infarction.  We will admit patient to  the hospital for further workup and symptomatic control.    DECISION TO DISCHARGE/ADMIT: see ED care timeline     FINAL IMPRESSION:   1 -- heart failure   2 -- abnormal ekg  3 --     Electronically signed by: Franny Noel MD, 1/13/2019 7:32 AM       Franny Noel MD  01/13/19 1204

## 2019-01-13 NOTE — ED NOTES
Dr. Quintero called at this time per Dr. Noel and estefania.      Holli Cleveland  01/13/19 0824

## 2019-01-13 NOTE — H&P
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  She has gotten steroids with some improvement in the symptoms and the recurrence of the shortness of breath again.  She has been unable to lie down flat in bed.  Last night she just could not take any deep breath in.  There is some pressure-like sensation in the upper part of the belly.  Does not feel her heart go fast or slow has not passed out.  She has never had this symptoms in the past.    Review of Systems   Pertinent items are noted in HPI  Review of Systems      History    Baseline EKG sinus rhythm loss of R waves from V1 to V5 ST elevation in the segments cannot be ruled out ST elevation in V5 V6 seen and high lateral leads.    #2 LV function assessment EF about 35-40% bedside echocardiogram 1/19.    CAD workup: None.    Hypertension: Borderline control.    COPD with emphysema.    Active nicotine abuse.    Postmenopausal status.    Arthritis.    Stress/anxiety disorder.    Urinary urgency with a prolapsed bladder.    Personal history:    Smokes up to one pack per day history of drug abuse or alcohol abuse.  Functional status is limited. 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    Family history:  Noncontributory.    Review of symptoms:    Has had a cough has had PND and orthopnea.  Has some swelling of the lower extremities unable to do any amount of walking without getting short of breath.  Does not feel like the heart is going fast or slow at any time has not passed out at any time.      Past Surgical History:   Procedure Laterality Date   • CARPAL TUNNEL RELEASE     • HYSTERECTOMY     • SHOULDER SURGERY     • TRIGGER FINGER RELEASE     , History  "reviewed. No pertinent family history., Social History     Tobacco Use   • Smoking status: Current Every Day Smoker     Types: Cigarettes   Substance Use Topics   • Alcohol use: No     Frequency: Never   • Drug use: No   ,   (Not in a hospital admission), Scheduled Meds:    amLODIPine 5 mg Oral Q24H   aspirin 81 mg Oral Daily   atorvastatin 80 mg Oral Nightly   clopidogrel 300 mg Oral Once   clopidogrel 75 mg Oral Daily   enoxaparin 40 mg Subcutaneous Q24H   famotidine 20 mg Oral BID   furosemide 20 mg Intravenous Q12H   ipratropium-albuterol 3 mL Nebulization 4x Daily - RT   losartan 50 mg Oral Q24H   metoprolol succinate XL 25 mg Oral Q24H   nicotine 1 patch Transdermal Q24H   predniSONE 40 mg Oral Daily   spironolactone 25 mg Oral Daily   , Continuous Infusions:   , PRN Meds:  sodium chloride, Allergies:  Patient has no known allergies.     Objective     Vital Sign Min/Max for last 24 hours  Temp  Min: 97.9 °F (36.6 °C)  Max: 97.9 °F (36.6 °C)   BP  Min: 131/75  Max: 157/84   Pulse  Min: 79  Max: 96   Resp  Min: 20  Max: 26   SpO2  Min: 95 %  Max: 99 %   Flow (L/min)  Min: 3  Max: 3   Weight  Min: 61.2 kg (135 lb)  Max: 61.2 kg (135 lb)     Flowsheet Rows      First Filed Value   Admission Height  170.2 cm (67\") Documented at 01/13/2019 0655   Admission Weight  61.2 kg (135 lb) Documented at 01/13/2019 0655               Physical Exam:     General Appearance:    Alert, cooperative, in no acute distress   Head:    Normocephalic, without obvious abnormality, atraumatic   Eyes:            Lids and lashes normal, conjunctivae and sclerae normal, no   icterus, no pallor, corneas clear, PERRLA   Ears:    Ears appear intact with no abnormalities noted   Throat:   No oral lesions, no thrush, oral mucosa moist   Neck:   No adenopathy, supple, trachea midline, no thyromegaly, no     carotid bruit, no JVD   Back:     No kyphosis present, no scoliosis present, no skin lesions,       erythema or scars, no tenderness to " percussion or                   palpation,   range of motion normal   Lungs:   Markedly diminished breath sounds bilaterally.    Heart:    Regular rhythm and normal rate, normal S1 and S2, no            murmur, no gallop, no rub, no click   Breast Exam:    Deferred   Abdomen:     Normal bowel sounds, no masses, no organomegaly, soft        non-tender, non-distended, no guarding, no rebound                 tenderness   Genitalia:    Deferred   Extremities:   Moves all extremities well, no edema, no cyanosis, no              redness   Pulses:   Pulses palpable and equal bilaterally   Skin:   No bleeding, bruising or rash   Lymph nodes:   No palpable adenopathy   Neurologic:   Cranial nerves 2 - 12 grossly intact, sensation intact, DTR        present and equal bilaterally       Results Review:   I reviewed the patient's new clinical results.  EKG:Sinus rhythm loss of R waves from V1 to with IVb 5 ST elevation seen.  ST elevation high lateral leads noted.    LAB DATA :           WBC   Date Value Ref Range Status   01/13/2019 8.97 4.80 - 10.80 10*3/mm3 Final     RBC   Date Value Ref Range Status   01/13/2019 4.68 4.20 - 5.40 10*6/mm3 Final     Hemoglobin   Date Value Ref Range Status   01/13/2019 14.2 12.0 - 16.0 g/dL Final     Hematocrit   Date Value Ref Range Status   01/13/2019 43.5 37.0 - 47.0 % Final     MCV   Date Value Ref Range Status   01/13/2019 92.9 81.0 - 99.0 fL Final     MCH   Date Value Ref Range Status   01/13/2019 30.3 27.0 - 31.0 pg Final     MCHC   Date Value Ref Range Status   01/13/2019 32.6 30.0 - 37.0 g/dL Final     RDW   Date Value Ref Range Status   01/13/2019 13.0 11.5 - 14.5 % Final     RDW-SD   Date Value Ref Range Status   01/13/2019 44.3 37.0 - 54.0 fl Final     MPV   Date Value Ref Range Status   01/13/2019 9.9 6.0 - 12.0 fL Final     Platelets   Date Value Ref Range Status   01/13/2019 353 130 - 400 10*3/mm3 Final     Neutrophil %   Date Value Ref Range Status   01/13/2019 54.6 37.0 - 80.0  % Final     Lymphocyte %   Date Value Ref Range Status   01/13/2019 26.3 10.0 - 50.0 % Final     Monocyte %   Date Value Ref Range Status   01/13/2019 8.8 0.0 - 12.0 % Final     Eosinophil %   Date Value Ref Range Status   01/13/2019 8.7 (H) 0.0 - 7.0 % Final     Basophil %   Date Value Ref Range Status   01/13/2019 1.0 0.0 - 2.5 % Final     Immature Grans %   Date Value Ref Range Status   01/13/2019 0.6 0.0 - 0.6 % Final     Neutrophils, Absolute   Date Value Ref Range Status   01/13/2019 4.90 2.00 - 6.90 10*3/mm3 Final     Lymphocytes, Absolute   Date Value Ref Range Status   01/13/2019 2.36 0.60 - 3.40 10*3/mm3 Final     Monocytes, Absolute   Date Value Ref Range Status   01/13/2019 0.79 0.00 - 0.90 10*3/mm3 Final     Eosinophils, Absolute   Date Value Ref Range Status   01/13/2019 0.78 (H) 0.00 - 0.70 10*3/mm3 Final     Basophils, Absolute   Date Value Ref Range Status   01/13/2019 0.09 0.00 - 0.20 10*3/mm3 Final     Immature Grans, Absolute   Date Value Ref Range Status   01/13/2019 0.05 0.00 - 0.06 10*3/mm3 Final     nRBC   Date Value Ref Range Status   01/13/2019 0.0 0.0 - 0.0 /100 WBC Final       Lab Results   Component Value Date    GLUCOSE 143 (H) 01/13/2019    BUN 8 01/13/2019    CREATININE 0.70 01/13/2019    EGFRIFNONA 82 01/13/2019    BCR 11.4 01/13/2019    CO2 29.0 01/13/2019    CALCIUM 8.9 01/13/2019    ALBUMIN 4.40 01/13/2019    AST 21 01/13/2019    ALT 24 01/13/2019       Lab Results   Component Value Date    TROPONINI 0.020 01/13/2019    TROPONINI 0.020 01/13/2019       No results found for: DDIMER    Site   Date Value Ref Range Status   01/13/2019 Arterial Line  Final     Asif's Test   Date Value Ref Range Status   01/13/2019 Positive  Final     pH, Arterial   Date Value Ref Range Status   01/13/2019 7.394 7.300 - 7.500 pH units Final     pCO2, Arterial   Date Value Ref Range Status   01/13/2019 46.7 (H) 35.0 - 45.0 mm Hg Final     Comment:     83 Value above reference range     pO2, Arterial    Date Value Ref Range Status   01/13/2019 71.3 (L) 75.0 - 100.0 mm Hg Final     Comment:     84 Value below reference range     HCO3, Arterial   Date Value Ref Range Status   01/13/2019 28.5 (H) 22.0 - 28.0 mmol/L Final     Comment:     83 Value above reference range     Base Excess, Arterial   Date Value Ref Range Status   01/13/2019 2.8 (H) 0.0 - 2.0 mmol/L Final     Comment:     83 Value above reference range     O2 Saturation, Arterial   Date Value Ref Range Status   01/13/2019 94.9 94.0 - 100.0 % Final     Hemoglobin, Blood Gas   Date Value Ref Range Status   01/13/2019 14.3 12 - 18 g/dL Final     Hematocrit, Blood Gas   Date Value Ref Range Status   01/13/2019 44.0 % Final     Oxyhemoglobin   Date Value Ref Range Status   01/13/2019 93.6 (L) 94 - 99 % Final     Comment:     84 Value below reference range     Methemoglobin   Date Value Ref Range Status   01/13/2019 0.60 0.00 - 1.50 % Final     Carboxyhemoglobin   Date Value Ref Range Status   01/13/2019 0.8 0 - 2 % Final     Barometric Pressure for Blood Gas   Date Value Ref Range Status   01/13/2019 735 mmHg Final     Modality   Date Value Ref Range Status   01/13/2019 Nasal Cannula  Final     FIO2   Date Value Ref Range Status   01/13/2019 32 % Final     No results found for: HGBA1C      No results found for: LIPASE    IMAGING DATA:     Xr Chest 2 View    Result Date: 1/13/2019  Narrative: PROCEDURE: XR CHEST 2 VW-   1/13/2019 7:25 AM  HISTORY: soa  COMPARISON:  January 2, 2019  FINDINGS:     The cardiac silhouette is proper size. The aortic contours are normal. The mediastinal and hilar structures are unremarkable. The lungs are clear. There is no pneumothorax.             Impression: No acute cardiopulmonary process.      This report was finalized on 1/13/2019 7:33 AM by Tamara Brownlee MD.    Xr Chest 2 View    Result Date: 1/3/2019  Narrative: PROCEDURE: XR CHEST 2 VW-    HISTORY: CHF/COPD Protocol; J44.1-Chronic obstructive pulmonary disease with (acute)  exacerbation  COMPARISON: October 21, 2013.  FINDINGS: The heart is normal in size. The mediastinum is unremarkable. The lungs are clear. There is no pneumothorax. There are no acute osseous abnormalities.         Impression: No acute cardiopulmonary process.    This report was finalized on 1/3/2019 8:15 AM by Natty Duggan M.D..      Assessment/Plan     DIAGNOSIS   #1 CHF:  Patient appears to have been having subacute systolic and diastolic dysfunction of the LV with symptoms associated with it.  Would prefer to keep her on IV Lasix and monitor her overnight.  We will start titrating appropriate medical therapy.  Patient is in agreement with this plan.The etiology for the LV dysfunction is not very clear.  The possible candidates are as recent acute anterior and anterolateral myocardial infarction versus stress-induced cardiomyopathy versus mild viral myocarditis [very unlikely].  Will complete the workup for the same while we titrate guideline directed medical therapy for now.    #2 hypertension:  Blood pressure seemed to be significantly elevated.  Will need further titration of medications the same is being pursued.    #3 coronary artery disease:  High probability for the same.  Symptoms have been atypical.  Wall motion by echocardiography is evident the EKG is very suggestive of possible anterior and anterolateral myocardial infarction about a month ago  Was start medical therapy at this time.  Definite evaluation for this needs to be pursued.  Discussion of her cardiac catheterization has been done.  Patient is open to the same.    #4COPD with emphysema:  Appears to be advanced and terminal.  We will keep on nebulization treatment Low-dose steroid therapy will be persisted with the blood evaluation for possible infection is being evaluated.  If this is abnormal will start antibiotic therapy only.  Fashion.    #5 nicotine abuse:  Need to quit cigarettes at a has been expanded again.  Patient expresses  understanding is going to be okay with a nicotine patch and try to come off of the same.            Myocardial infarction (CMS/HCC)          I discussed the patients findings and my recommendations with patient    Suraj Quintero MD  01/13/19  12:07 PM

## 2019-01-13 NOTE — PLAN OF CARE
Problem: Patient Care Overview  Goal: Plan of Care Review  Outcome: Ongoing (interventions implemented as appropriate)   01/13/19 2193   Coping/Psychosocial   Plan of Care Reviewed With patient;family   Plan of Care Review   Progress no change   OTHER   Outcome Summary new admission

## 2019-01-13 NOTE — ED NOTES
House supervisor called at this time for bed assignment. Will assign bed shortly.      Holli Cleveland  01/13/19 1209

## 2019-01-14 ENCOUNTER — APPOINTMENT (OUTPATIENT)
Dept: CARDIOLOGY | Facility: HOSPITAL | Age: 74
End: 2019-01-14
Attending: INTERNAL MEDICINE

## 2019-01-14 PROBLEM — I21.4 NON-ST ELEVATION (NSTEMI) MYOCARDIAL INFARCTION: Status: ACTIVE | Noted: 2019-01-13

## 2019-01-14 LAB
ALBUMIN SERPL-MCNC: 4.5 G/DL (ref 3.5–5)
ALBUMIN/GLOB SERPL: 1.6 G/DL (ref 1–2)
ALP SERPL-CCNC: 68 U/L (ref 38–126)
ALT SERPL W P-5'-P-CCNC: 28 U/L (ref 13–69)
ANION GAP SERPL CALCULATED.3IONS-SCNC: 12.1 MMOL/L (ref 10–20)
AST SERPL-CCNC: 21 U/L (ref 15–46)
BASOPHILS # BLD AUTO: 0.02 10*3/MM3 (ref 0–0.2)
BASOPHILS NFR BLD AUTO: 0.1 % (ref 0–2.5)
BILIRUB SERPL-MCNC: 0.9 MG/DL (ref 0.2–1.3)
BUN BLD-MCNC: 17 MG/DL (ref 7–20)
BUN/CREAT SERPL: 21.3 (ref 7.1–23.5)
CALCIUM SPEC-SCNC: 9.5 MG/DL (ref 8.4–10.2)
CHLORIDE SERPL-SCNC: 93 MMOL/L (ref 98–107)
CO2 SERPL-SCNC: 33 MMOL/L (ref 26–30)
CREAT BLD-MCNC: 0.8 MG/DL (ref 0.6–1.3)
DEPRECATED RDW RBC AUTO: 42 FL (ref 37–54)
EOSINOPHIL # BLD AUTO: 0.01 10*3/MM3 (ref 0–0.7)
EOSINOPHIL NFR BLD AUTO: 0.1 % (ref 0–7)
ERYTHROCYTE [DISTWIDTH] IN BLOOD BY AUTOMATED COUNT: 12.7 % (ref 11.5–14.5)
GFR SERPL CREATININE-BSD FRML MDRD: 70 ML/MIN/1.73
GLOBULIN UR ELPH-MCNC: 2.8 GM/DL
GLUCOSE BLD-MCNC: 125 MG/DL (ref 74–98)
HBA1C MFR BLD: 6 % (ref 3–6)
HCT VFR BLD AUTO: 44.7 % (ref 37–47)
HGB BLD-MCNC: 15.2 G/DL (ref 12–16)
IMM GRANULOCYTES # BLD AUTO: 0.1 10*3/MM3 (ref 0–0.06)
IMM GRANULOCYTES NFR BLD AUTO: 0.7 % (ref 0–0.6)
LYMPHOCYTES # BLD AUTO: 1.64 10*3/MM3 (ref 0.6–3.4)
LYMPHOCYTES NFR BLD AUTO: 12 % (ref 10–50)
MAXIMAL PREDICTED HEART RATE: 146 BPM
MCH RBC QN AUTO: 30.8 PG (ref 27–31)
MCHC RBC AUTO-ENTMCNC: 34 G/DL (ref 30–37)
MCV RBC AUTO: 90.7 FL (ref 81–99)
MONOCYTES # BLD AUTO: 1.3 10*3/MM3 (ref 0–0.9)
MONOCYTES NFR BLD AUTO: 9.5 % (ref 0–12)
NEUTROPHILS # BLD AUTO: 10.63 10*3/MM3 (ref 2–6.9)
NEUTROPHILS NFR BLD AUTO: 77.6 % (ref 37–80)
NRBC BLD AUTO-RTO: 0 /100 WBC (ref 0–0)
PLATELET # BLD AUTO: 392 10*3/MM3 (ref 130–400)
PMV BLD AUTO: 10 FL (ref 6–12)
POTASSIUM BLD-SCNC: 4.1 MMOL/L (ref 3.5–5.1)
PROT SERPL-MCNC: 7.3 G/DL (ref 6.3–8.2)
RBC # BLD AUTO: 4.93 10*6/MM3 (ref 4.2–5.4)
SODIUM BLD-SCNC: 134 MMOL/L (ref 137–145)
STRESS TARGET HR: 124 BPM
TROPONIN I SERPL-MCNC: 0.04 NG/ML (ref 0–0.03)
WBC NRBC COR # BLD: 13.7 10*3/MM3 (ref 4.8–10.8)

## 2019-01-14 PROCEDURE — 93458 L HRT ARTERY/VENTRICLE ANGIO: CPT | Performed by: INTERNAL MEDICINE

## 2019-01-14 PROCEDURE — 25010000002 MIDAZOLAM PER 1 MG: Performed by: INTERNAL MEDICINE

## 2019-01-14 PROCEDURE — 94799 UNLISTED PULMONARY SVC/PX: CPT

## 2019-01-14 PROCEDURE — B2151ZZ FLUOROSCOPY OF LEFT HEART USING LOW OSMOLAR CONTRAST: ICD-10-PCS | Performed by: INTERNAL MEDICINE

## 2019-01-14 PROCEDURE — 0 IOPAMIDOL PER 1 ML: Performed by: INTERNAL MEDICINE

## 2019-01-14 PROCEDURE — 93306 TTE W/DOPPLER COMPLETE: CPT

## 2019-01-14 PROCEDURE — 25010000002 ONDANSETRON PER 1 MG: Performed by: INTERNAL MEDICINE

## 2019-01-14 PROCEDURE — 4A023N7 MEASUREMENT OF CARDIAC SAMPLING AND PRESSURE, LEFT HEART, PERCUTANEOUS APPROACH: ICD-10-PCS | Performed by: INTERNAL MEDICINE

## 2019-01-14 PROCEDURE — 80053 COMPREHEN METABOLIC PANEL: CPT | Performed by: INTERNAL MEDICINE

## 2019-01-14 PROCEDURE — 63710000001 PREDNISONE PER 1 MG: Performed by: INTERNAL MEDICINE

## 2019-01-14 PROCEDURE — C1769 GUIDE WIRE: HCPCS | Performed by: INTERNAL MEDICINE

## 2019-01-14 PROCEDURE — 84484 ASSAY OF TROPONIN QUANT: CPT | Performed by: INTERNAL MEDICINE

## 2019-01-14 PROCEDURE — 25010000002 FENTANYL CITRATE (PF) 100 MCG/2ML SOLUTION: Performed by: INTERNAL MEDICINE

## 2019-01-14 PROCEDURE — 25010000002 BIVALIRUDIN TRIFLUOROACETATE 250 MG RECONSTITUTED SOLUTION 1 EACH VIAL: Performed by: INTERNAL MEDICINE

## 2019-01-14 PROCEDURE — C1887 CATHETER, GUIDING: HCPCS | Performed by: INTERNAL MEDICINE

## 2019-01-14 PROCEDURE — 25010000002 HEPARIN (PORCINE) PER 1000 UNITS: Performed by: INTERNAL MEDICINE

## 2019-01-14 PROCEDURE — C1725 CATH, TRANSLUMIN NON-LASER: HCPCS | Performed by: INTERNAL MEDICINE

## 2019-01-14 PROCEDURE — C1894 INTRO/SHEATH, NON-LASER: HCPCS | Performed by: INTERNAL MEDICINE

## 2019-01-14 PROCEDURE — 85025 COMPLETE CBC W/AUTO DIFF WBC: CPT | Performed by: INTERNAL MEDICINE

## 2019-01-14 PROCEDURE — B2111ZZ FLUOROSCOPY OF MULTIPLE CORONARY ARTERIES USING LOW OSMOLAR CONTRAST: ICD-10-PCS | Performed by: INTERNAL MEDICINE

## 2019-01-14 PROCEDURE — 25010000002 FUROSEMIDE PER 20 MG: Performed by: INTERNAL MEDICINE

## 2019-01-14 RX ORDER — MIDAZOLAM HYDROCHLORIDE 1 MG/ML
INJECTION INTRAMUSCULAR; INTRAVENOUS AS NEEDED
Status: DISCONTINUED | OUTPATIENT
Start: 2019-01-14 | End: 2019-01-14 | Stop reason: HOSPADM

## 2019-01-14 RX ORDER — SODIUM CHLORIDE 9 MG/ML
50 INJECTION, SOLUTION INTRAVENOUS CONTINUOUS
Status: DISCONTINUED | OUTPATIENT
Start: 2019-01-14 | End: 2019-01-14

## 2019-01-14 RX ORDER — NICOTINE 21 MG/24HR
1 PATCH, TRANSDERMAL 24 HOURS TRANSDERMAL
Status: DISCONTINUED | OUTPATIENT
Start: 2019-01-14 | End: 2019-01-17 | Stop reason: HOSPADM

## 2019-01-14 RX ORDER — ONDANSETRON 2 MG/ML
4 INJECTION INTRAMUSCULAR; INTRAVENOUS EVERY 6 HOURS PRN
Status: DISCONTINUED | OUTPATIENT
Start: 2019-01-14 | End: 2019-01-14

## 2019-01-14 RX ORDER — ACETAMINOPHEN 325 MG/1
650 TABLET ORAL EVERY 4 HOURS PRN
Status: DISCONTINUED | OUTPATIENT
Start: 2019-01-14 | End: 2019-01-17 | Stop reason: HOSPADM

## 2019-01-14 RX ORDER — LIDOCAINE HYDROCHLORIDE 10 MG/ML
INJECTION, SOLUTION INFILTRATION; PERINEURAL AS NEEDED
Status: DISCONTINUED | OUTPATIENT
Start: 2019-01-14 | End: 2019-01-14 | Stop reason: HOSPADM

## 2019-01-14 RX ORDER — HYDROCODONE BITARTRATE AND ACETAMINOPHEN 5; 325 MG/1; MG/1
1 TABLET ORAL EVERY 4 HOURS PRN
Status: DISCONTINUED | OUTPATIENT
Start: 2019-01-14 | End: 2019-01-17 | Stop reason: HOSPADM

## 2019-01-14 RX ORDER — FENTANYL CITRATE 50 UG/ML
INJECTION, SOLUTION INTRAMUSCULAR; INTRAVENOUS AS NEEDED
Status: DISCONTINUED | OUTPATIENT
Start: 2019-01-14 | End: 2019-01-14 | Stop reason: HOSPADM

## 2019-01-14 RX ORDER — LOSARTAN POTASSIUM 50 MG/1
50 TABLET ORAL
Status: DISCONTINUED | OUTPATIENT
Start: 2019-01-15 | End: 2019-01-17 | Stop reason: HOSPADM

## 2019-01-14 RX ADMIN — CLOPIDOGREL BISULFATE 75 MG: 75 TABLET ORAL at 09:25

## 2019-01-14 RX ADMIN — SODIUM CHLORIDE 50 ML/HR: 9 INJECTION, SOLUTION INTRAVENOUS at 14:14

## 2019-01-14 RX ADMIN — ATORVASTATIN CALCIUM 80 MG: 80 TABLET, FILM COATED ORAL at 22:14

## 2019-01-14 RX ADMIN — IPRATROPIUM BROMIDE AND ALBUTEROL SULFATE 3 ML: .5; 3 SOLUTION RESPIRATORY (INHALATION) at 16:22

## 2019-01-14 RX ADMIN — IPRATROPIUM BROMIDE AND ALBUTEROL SULFATE 3 ML: .5; 3 SOLUTION RESPIRATORY (INHALATION) at 20:05

## 2019-01-14 RX ADMIN — METOPROLOL SUCCINATE 25 MG: 25 TABLET, EXTENDED RELEASE ORAL at 09:24

## 2019-01-14 RX ADMIN — IPRATROPIUM BROMIDE AND ALBUTEROL SULFATE 3 ML: .5; 3 SOLUTION RESPIRATORY (INHALATION) at 07:27

## 2019-01-14 RX ADMIN — ASPIRIN 81 MG: 81 TABLET, COATED ORAL at 09:24

## 2019-01-14 RX ADMIN — SPIRONOLACTONE 25 MG: 25 TABLET, FILM COATED ORAL at 09:24

## 2019-01-14 RX ADMIN — LOSARTAN POTASSIUM 50 MG: 50 TABLET, FILM COATED ORAL at 09:25

## 2019-01-14 RX ADMIN — AMLODIPINE BESYLATE 5 MG: 5 TABLET ORAL at 09:25

## 2019-01-14 RX ADMIN — PREDNISONE 40 MG: 20 TABLET ORAL at 09:24

## 2019-01-14 RX ADMIN — ONDANSETRON 4 MG: 2 INJECTION INTRAMUSCULAR; INTRAVENOUS at 09:30

## 2019-01-14 RX ADMIN — NICOTINE 1 PATCH: 14 PATCH TRANSDERMAL at 22:55

## 2019-01-14 RX ADMIN — SODIUM CHLORIDE 50 ML/HR: 9 INJECTION, SOLUTION INTRAVENOUS at 09:26

## 2019-01-14 RX ADMIN — FUROSEMIDE 20 MG: 10 INJECTION, SOLUTION INTRAMUSCULAR; INTRAVENOUS at 06:51

## 2019-01-14 NOTE — PLAN OF CARE
Problem: Patient Care Overview  Goal: Plan of Care Review  Outcome: Ongoing (interventions implemented as appropriate)   01/14/19 0244   Coping/Psychosocial   Plan of Care Reviewed With patient   Plan of Care Review   Progress no change   OTHER   Outcome Summary NEW ADMISSION, REPORTS IMPROVED BREATHING, NO C/O CHEST PAIN     Goal: Discharge Needs Assessment  Outcome: Ongoing (interventions implemented as appropriate)      Problem: Fall Risk (Adult)  Goal: Identify Related Risk Factors and Signs and Symptoms  Outcome: Outcome(s) achieved Date Met: 01/14/19    Goal: Absence of Fall  Outcome: Ongoing (interventions implemented as appropriate)      Problem: Cardiac: ACS (Acute Coronary Syndrome) (Adult)  Goal: Signs and Symptoms of Listed Potential Problems Will be Absent, Minimized or Managed (Cardiac: ACS)  Outcome: Ongoing (interventions implemented as appropriate)

## 2019-01-14 NOTE — PLAN OF CARE
Problem: Cardiac: ACS (Acute Coronary Syndrome) (Adult)  Goal: Signs and Symptoms of Listed Potential Problems Will be Absent, Minimized or Managed (Cardiac: ACS)  Outcome: Ongoing (interventions implemented as appropriate)   01/14/19 5007   Goal/Outcome Evaluation   Problems Assessed (Acute Coronary Syndrome) all   Problems Present (Acute Coronary Syn) ischemia leading to infarction

## 2019-01-14 NOTE — PROGRESS NOTES
Continued Stay Note  SILVERIO Herrera     Patient Name: Ijeoma Palacios  MRN: [id number]  Today's Date: 1/14/2019    Admit Date: 1/13/2019    Discharge Plan     Row Name 01/14/19 1206       Plan    Plan  Home     Patient/Family in Agreement with Plan  yes    Plan Comments  Spoke to pt regarding discahrge plans  She lives with her adult daughter Still drives works parttime bus moniter  Has Oxygen 2.5  wears it 14 -24 hours  a day [employer] is the DME able to preform ADLS has a nebulizer confirmed address and phone number at this time dc is planned for home         Discharge Codes    No documentation.             Keesha Molina

## 2019-01-14 NOTE — PLAN OF CARE
Problem: Cardiac Catheterization (Diagnostic/Interventional) (Adult)  Goal: Signs and Symptoms of Listed Potential Problems Will be Absent, Minimized or Managed (Cardiac Catheterization)  Outcome: Ongoing (interventions implemented as appropriate)   01/14/19 5228   Goal/Outcome Evaluation   Problems Assessed (Cardiac Catheterization) all   Problems Present (Cardiac Cath) none

## 2019-01-15 ENCOUNTER — APPOINTMENT (OUTPATIENT)
Dept: NUCLEAR MEDICINE | Facility: HOSPITAL | Age: 74
End: 2019-01-15

## 2019-01-15 LAB
ALBUMIN SERPL-MCNC: 4.1 G/DL (ref 3.5–5)
ALBUMIN/GLOB SERPL: 1.5 G/DL (ref 1–2)
ALP SERPL-CCNC: 58 U/L (ref 38–126)
ALT SERPL W P-5'-P-CCNC: 32 U/L (ref 13–69)
ANION GAP SERPL CALCULATED.3IONS-SCNC: 10.4 MMOL/L (ref 10–20)
AST SERPL-CCNC: 22 U/L (ref 15–46)
BASOPHILS # BLD AUTO: 0.05 10*3/MM3 (ref 0–0.2)
BASOPHILS NFR BLD AUTO: 0.3 % (ref 0–2.5)
BILIRUB SERPL-MCNC: 0.5 MG/DL (ref 0.2–1.3)
BUN BLD-MCNC: 19 MG/DL (ref 7–20)
BUN/CREAT SERPL: 21.1 (ref 7.1–23.5)
CALCIUM SPEC-SCNC: 9.2 MG/DL (ref 8.4–10.2)
CHLORIDE SERPL-SCNC: 95 MMOL/L (ref 98–107)
CO2 SERPL-SCNC: 32 MMOL/L (ref 26–30)
CREAT BLD-MCNC: 0.9 MG/DL (ref 0.6–1.3)
DEPRECATED RDW RBC AUTO: 42.6 FL (ref 37–54)
EOSINOPHIL # BLD AUTO: 0.01 10*3/MM3 (ref 0–0.7)
EOSINOPHIL NFR BLD AUTO: 0.1 % (ref 0–7)
ERYTHROCYTE [DISTWIDTH] IN BLOOD BY AUTOMATED COUNT: 13 % (ref 11.5–14.5)
GFR SERPL CREATININE-BSD FRML MDRD: 61 ML/MIN/1.73
GLOBULIN UR ELPH-MCNC: 2.8 GM/DL
GLUCOSE BLD-MCNC: 97 MG/DL (ref 74–98)
HCT VFR BLD AUTO: 42 % (ref 37–47)
HGB BLD-MCNC: 13.9 G/DL (ref 12–16)
IMM GRANULOCYTES # BLD AUTO: 0.11 10*3/MM3 (ref 0–0.06)
IMM GRANULOCYTES NFR BLD AUTO: 0.6 % (ref 0–0.6)
LYMPHOCYTES # BLD AUTO: 3.52 10*3/MM3 (ref 0.6–3.4)
LYMPHOCYTES NFR BLD AUTO: 19.7 % (ref 10–50)
MCH RBC QN AUTO: 30.2 PG (ref 27–31)
MCHC RBC AUTO-ENTMCNC: 33.1 G/DL (ref 30–37)
MCV RBC AUTO: 91.1 FL (ref 81–99)
MONOCYTES # BLD AUTO: 1.54 10*3/MM3 (ref 0–0.9)
MONOCYTES NFR BLD AUTO: 8.6 % (ref 0–12)
NEUTROPHILS # BLD AUTO: 12.62 10*3/MM3 (ref 2–6.9)
NEUTROPHILS NFR BLD AUTO: 70.7 % (ref 37–80)
NRBC BLD AUTO-RTO: 0 /100 WBC (ref 0–0)
PLATELET # BLD AUTO: 385 10*3/MM3 (ref 130–400)
PMV BLD AUTO: 10.3 FL (ref 6–12)
POTASSIUM BLD-SCNC: 4.4 MMOL/L (ref 3.5–5.1)
PROT SERPL-MCNC: 6.9 G/DL (ref 6.3–8.2)
RBC # BLD AUTO: 4.61 10*6/MM3 (ref 4.2–5.4)
SODIUM BLD-SCNC: 133 MMOL/L (ref 137–145)
WBC NRBC COR # BLD: 17.85 10*3/MM3 (ref 4.8–10.8)

## 2019-01-15 PROCEDURE — 85025 COMPLETE CBC W/AUTO DIFF WBC: CPT | Performed by: INTERNAL MEDICINE

## 2019-01-15 PROCEDURE — A9505 TL201 THALLIUM: HCPCS | Performed by: INTERNAL MEDICINE

## 2019-01-15 PROCEDURE — 80053 COMPREHEN METABOLIC PANEL: CPT | Performed by: INTERNAL MEDICINE

## 2019-01-15 PROCEDURE — 63710000001 PREDNISONE PER 1 MG: Performed by: INTERNAL MEDICINE

## 2019-01-15 PROCEDURE — 94799 UNLISTED PULMONARY SVC/PX: CPT

## 2019-01-15 PROCEDURE — 36415 COLL VENOUS BLD VENIPUNCTURE: CPT | Performed by: INTERNAL MEDICINE

## 2019-01-15 PROCEDURE — 25010000002 ENOXAPARIN PER 10 MG: Performed by: INTERNAL MEDICINE

## 2019-01-15 PROCEDURE — 0 THALLOUS CHLORIDE 1 MCI/ML SOLUTION: Performed by: INTERNAL MEDICINE

## 2019-01-15 PROCEDURE — 78469 HEART INFARCT IMAGE (3D): CPT

## 2019-01-15 RX ORDER — THALLOUS CHLORIDE TL-201 1 MCI/ML
3.2 INJECTION, POWDER, LYOPHILIZED, FOR SOLUTION INTRAVENOUS
Status: COMPLETED | OUTPATIENT
Start: 2019-01-15 | End: 2019-01-15

## 2019-01-15 RX ADMIN — THALLOUS CHLORIDE TL-201 3.2 MILLICURIE: 1 INJECTION, POWDER, LYOPHILIZED, FOR SOLUTION INTRAVENOUS at 10:07

## 2019-01-15 RX ADMIN — PREDNISONE 40 MG: 20 TABLET ORAL at 09:31

## 2019-01-15 RX ADMIN — METOPROLOL SUCCINATE 25 MG: 25 TABLET, EXTENDED RELEASE ORAL at 09:31

## 2019-01-15 RX ADMIN — IPRATROPIUM BROMIDE AND ALBUTEROL SULFATE 3 ML: .5; 3 SOLUTION RESPIRATORY (INHALATION) at 20:32

## 2019-01-15 RX ADMIN — IPRATROPIUM BROMIDE AND ALBUTEROL SULFATE 3 ML: .5; 3 SOLUTION RESPIRATORY (INHALATION) at 12:24

## 2019-01-15 RX ADMIN — AMLODIPINE BESYLATE 5 MG: 5 TABLET ORAL at 09:31

## 2019-01-15 RX ADMIN — ATORVASTATIN CALCIUM 80 MG: 80 TABLET, FILM COATED ORAL at 20:56

## 2019-01-15 RX ADMIN — IPRATROPIUM BROMIDE AND ALBUTEROL SULFATE 3 ML: .5; 3 SOLUTION RESPIRATORY (INHALATION) at 15:44

## 2019-01-15 RX ADMIN — ENOXAPARIN SODIUM 40 MG: 40 INJECTION SUBCUTANEOUS at 14:45

## 2019-01-15 RX ADMIN — IPRATROPIUM BROMIDE AND ALBUTEROL SULFATE 3 ML: .5; 3 SOLUTION RESPIRATORY (INHALATION) at 07:20

## 2019-01-15 RX ADMIN — ASPIRIN 81 MG: 81 TABLET, COATED ORAL at 09:30

## 2019-01-15 RX ADMIN — CLOPIDOGREL BISULFATE 75 MG: 75 TABLET ORAL at 09:30

## 2019-01-15 RX ADMIN — SPIRONOLACTONE 25 MG: 25 TABLET, FILM COATED ORAL at 09:31

## 2019-01-15 RX ADMIN — LOSARTAN POTASSIUM 50 MG: 50 TABLET, FILM COATED ORAL at 09:31

## 2019-01-15 NOTE — PLAN OF CARE
Problem: Cardiac Catheterization (Diagnostic/Interventional) (Adult)  Goal: Signs and Symptoms of Listed Potential Problems Will be Absent, Minimized or Managed (Cardiac Catheterization)   01/14/19 6699   Goal/Outcome Evaluation   Problems Present (Cardiac Cath) Patient post cath, doing well, right radial occlusive dressing clean dry and intact

## 2019-01-15 NOTE — PROGRESS NOTES
"   LOS: 1 day   Patient Care Team:  Maria De Jesus Morrell MD as PCP - General (Family Medicine)  Maria De Jesus Morrell MD (Family Medicine)    Interval History: She felt some better.  At slept some better in the hospital.  Continues to be short of breath there is no chest pain   Review of Systems:   Pertinent items are noted in HPI.      Objective     Vital Sign Min/Max for last 24 hours  Temp  Min: 97.2 °F (36.2 °C)  Max: 98.3 °F (36.8 °C)   BP  Min: 107/70  Max: 147/83   Pulse  Min: 68  Max: 89   Resp  Min: 14  Max: 18   SpO2  Min: 95 %  Max: 97 %   Flow (L/min)  Min: 3  Max: 4   Weight  Min: 59.4 kg (131 lb)  Max: 59.6 kg (131 lb 8 oz)     Flowsheet Rows      First Filed Value   Admission Height  170.2 cm (67\") Documented at 01/13/2019 0655   Admission Weight  61.2 kg (135 lb) Documented at 01/13/2019 0655          Physical Exam:     General Appearance:    Alert, cooperative, in no acute distress   Head:    Normocephalic, without obvious abnormality, atraumatic   Eyes:            Lids and lashes normal, conjunctivae and sclerae normal, no   icterus, no pallor, corneas clear, PERRLA   Ears:    Ears appear intact with no abnormalities noted   Throat:   No oral lesions, no thrush, oral mucosa moist   Neck:   No adenopathy, supple, trachea midline, no thyromegaly, no     carotid bruit, no JVD   Back:     No kyphosis present, no scoliosis present, no skin lesions,       erythema or scars, no tenderness to percussion or                   palpation,   range of motion normal   Lungs:     Decreased breath sounds in both bases.      Heart:    Regular rhythm and normal rate, normal S1 and S2, no            murmur, no gallop, no rub, no click   Breast Exam:    Deferred   Abdomen:     Normal bowel sounds, no masses, no organomegaly, soft        non-tender, non-distended, no guarding, no rebound                 tenderness   Genitalia:    Deferred   Extremities:   Moves all extremities well, no edema, no cyanosis, no              redness "   Pulses:   Pulses palpable and equal bilaterally   Skin:   No bleeding, bruising or rash   Lymph nodes:   No palpable adenopathy   Neurologic:   Cranial nerves 2 - 12 grossly intact, sensation intact, DTR        present and equal bilaterally        Results Review:     I reviewed the patient's new clinical results.    Results Review:   I reviewed the patient's new clinical results.        LAB DATA :     Results from last 7 days   Lab Units  01/13/19   0700   CHOLESTEROL mg/dL  176   TRIGLYCERIDES mg/dL  75   HDL CHOL mg/dL  58   LDL CHOL mg/dL  103*       Laboratory results:    Results from last 7 days   Lab Units  01/14/19   0541  01/13/19   0700   SODIUM mmol/L  134*  134*   POTASSIUM mmol/L  4.1  4.2   CHLORIDE mmol/L  93*  98   CO2 mmol/L  33.0*  29.0   BUN mg/dL  17  8   CREATININE mg/dL  0.80  0.70   CALCIUM mg/dL  9.5  8.9   BILIRUBIN mg/dL  0.9  0.8   ALK PHOS U/L  68  74   ALT (SGPT) U/L  28  24   AST (SGOT) U/L  21  21   GLUCOSE mg/dL  125*  143*     Results from last 7 days   Lab Units  01/14/19   0541  01/13/19   0700   WBC 10*3/mm3  13.70*  8.97   HEMOGLOBIN g/dL  15.2  14.2   HEMATOCRIT %  44.7  43.5   PLATELETS 10*3/mm3  392  353                 Results from last 7 days   Lab Units  01/14/19   0541   TROPONIN I ng/mL  0.038*             Results from last 7 days   Lab Units  01/13/19   0817   PH, ARTERIAL pH units  7.394   PCO2, ARTERIAL mm Hg  46.7*   PO2 ART mm Hg  71.3*   HCO3 ART mmol/L  28.5*   BASE EXCESS ART mmol/L  2.8*   O2 SATURATION ART %  94.9   HEMOGLOBIN, ARTERIAL g/dL  14.3   HEMATOCRIT, ARTERIAL %  44.0   OXYHEMOGLOBIN %  93.6*   METHEMOGLOBIN %  0.60   CARBOXYHEMOGLOBIN %  0.8   BAROMETRIC PRESSURE FOR BLOOD GAS mmHg  735   MODALITY   Nasal Cannula   FIO2 %  32     Lab Results   Component Value Date    HGBA1C 6.0 01/13/2019     Results from last 7 days   Lab Units  01/13/19   0700   TSH mIU/mL  4.490   FREE T4 ng/dL  1.34           IMAGING DATA:     Xr Chest 2 View    Result Date:  1/13/2019  Narrative: PROCEDURE: XR CHEST 2 VW-   1/13/2019 7:25 AM  HISTORY: soa  COMPARISON:  January 2, 2019  FINDINGS:     The cardiac silhouette is proper size. The aortic contours are normal. The mediastinal and hilar structures are unremarkable. The lungs are clear. There is no pneumothorax.             Impression: No acute cardiopulmonary process.      This report was finalized on 1/13/2019 7:33 AM by Tamara Brownlee MD.    Xr Chest 2 View    Result Date: 1/3/2019  Narrative: PROCEDURE: XR CHEST 2 VW-    HISTORY: CHF/COPD Protocol; J44.1-Chronic obstructive pulmonary disease with (acute) exacerbation  COMPARISON: October 21, 2013.  FINDINGS: The heart is normal in size. The mediastinum is unremarkable. The lungs are clear. There is no pneumothorax. There are no acute osseous abnormalities.         Impression: No acute cardiopulmonary process.    This report was finalized on 1/3/2019 8:15 AM by Natty Duggan M.D..            Assessment/Plan    #1 coronary artery disease:  Has an occluded LAD in its midsection appears to be more of a subacute or chronic in nature.  Will need evaluation of his anterior wall for viability prior to making revascularization plans.  Also has disease in the ostial LAD and the diagonal proximally.    #2 hypertension:  Seems to be doing well on the present medications continue the same.    #3 LV dysfunction:  It's predominantly ischemic.  On appropriate medical therapy we'll gradually up titrate the medications as permissible.    #4 COPD with emphysema:  Continue low-dose steroid therapy and treatments at this time.    #5 Nicotine Abuse:  Needs to get off the cigarettes ASAP.  She is been informed of the same.      Non-ST elevation (NSTEMI) myocardial infarction (CMS/HCC)    Myocardial infarction (CMS/HCC)            Suraj Quintero MD  01/14/19  7:50 PM

## 2019-01-15 NOTE — PROGRESS NOTES
Adult Nutrition  Assessment/PES    Patient Name:  Ijeoma Palacios  YOB: 1945  MRN: [id number]  Admit Date:  1/13/2019    Assessment Date:  1/15/2019    Comments:  Rec #1: Continue current diet order; Encouraging intake. Pt receiving 50% PO intake over 1 meal. Rec #2: Consider MVI with minerals daily. Rec #3: Continue to monitor/replace electrolytes PRN. RD to follow pt. Consult RD PRN.       Reason for Assessment     Row Name 01/15/19 1532          Reason for Assessment    Reason For Assessment  diagnosis/disease state;identified at risk by screening criteria     Diagnosis  pulmonary disease;cardiac disease CAD, COPD, HTN, Nicotine Abuse             Labs/Tests/Procedures/Meds     Row Name 01/15/19 1534          Labs/Procedures/Meds    Lab Results Reviewed  reviewed, pertinent     Lab Results Comments  Low: Na, Cl        Medications    Pertinent Medications Reviewed  reviewed           Estimated/Assessed Needs     Row Name 01/15/19 1538          Calculation Measurements    Weight Used For Calculations  59.7 kg (131 lb 9.8 oz)        Estimated/Assessed Needs    Additional Documentation  Fluid Requirements (Group);Joint Base Mdl-St. Jeor Equation (Group);Protein Requirements (Group);Calorie Requirements (Group)        Calorie Requirements    Weight Used For Calorie Calculations  -- AF 1.3     Estimated Calorie Need Method  Joint [employer] Mdl-St Jeor     Estimated Calorie Requirement Comment  ~[id number]        KCAL/KG    14 Kcal/Kg (kcal)  835.8     15 Kcal/Kg (kcal)  895.5     18 Kcal/Kg (kcal)  1074.6     20 Kcal/Kg (kcal)  1194     25 Kcal/Kg (kcal)  1492.5     30 Kcal/Kg (kcal)  1791     35 Kcal/Kg (kcal)  2089.5     40 Kcal/Kg (kcal)  2388     45 Kcal/Kg (kcal)  2686.5     50 Kcal/Kg (kcal)  2985        Joint Base Mdl-St. Jeor Equation    RMR (Joint Base Mdl-St. Jeor Equation)  1129.63        Protein Requirements    Est Protein Requirement Amount (gms/kg)  1.5 gm protein 72-90 gm     Estimated Protein Requirements (gms/day)  89.55         Fluid Requirements    Estimated Fluid Requirement Method  Chanel-Segar Formula     Chanel-Segar Method (over 20 kg)  2698         Nutrition Prescription Ordered     Row Name 01/15/19 1536          Nutrition Prescription PO    Current PO Diet  Regular     Common Modifiers  Cardiac         Evaluation of Received Nutrient/Fluid Intake     Row Name 01/15/19 1535          Calculation Measurements    Weight Used For Calculations  59.7 kg (131 lb 9.8 oz)        PO Evaluation    Number of Days PO Intake Evaluated  1 day     Number of Meals  1     % PO Intake  50         Evaluation of Prescribed Nutrient/Fluid Intake     Row Name 01/15/19 1535          Calculation Measurements    Weight Used For Calculations  59.7 kg (131 lb 9.8 oz)             Problem/Interventions:  Problem 1     Row Name 01/15/19 1537          Nutrition Diagnoses Problem 1    Problem 1  Increased Nutrient Needs     Macronutrient  Kcal;Carbohydrate;Protein;Fat     Micronutrient  Vitamin;Mineral     Etiology (related to)  Medical Diagnosis     Pulmonary/Critical Care  COPD     Signs/Symptoms (evidenced by)  Other (comment) pulmonary dysfunction                 Intervention Goal     Row Name 01/15/19 1538          Intervention Goal    General  Meet nutritional needs for age/condition     PO  Meet estimated needs;Increase intake;PO intake (%)     PO Intake %  75 %     Weight  Maintain weight         Nutrition Intervention     Row Name 01/15/19 1538          Nutrition Intervention    RD/Tech Action  Follow Tx progress;Care plan reviewd;Encourage intake         Nutrition Prescription     Row Name 01/15/19 1539          Nutrition Prescription PO    PO Prescription  Other (comment) Continue current diet order     New PO Prescription Ordered?  No, recommended        Other Orders    Obtain Weight  Daily     Obtain Weight Ordered?  No, recommended         Education/Evaluation     Row Name 01/15/19 1539          Education    Education  Will Instruct as  appropriate        Monitor/Evaluation    Monitor  Per protocol;I&O;PO intake;Pertinent labs;Weight           Electronically signed by:  Fela Johnston RD  01/15/19 3:40 PM

## 2019-01-15 NOTE — PLAN OF CARE
Problem: Cardiac: ACS (Acute Coronary Syndrome) (Adult)  Goal: Signs and Symptoms of Listed Potential Problems Will be Absent, Minimized or Managed (Cardiac: ACS)  Outcome: Ongoing (interventions implemented as appropriate)   01/14/19 1842 01/15/19 1220   Goal/Outcome Evaluation   Problems Assessed (Acute Coronary Syndrome) --  all   Problems Present (Acute Coronary Syn) ischemia leading to infarction --

## 2019-01-16 LAB
A-A DO2: ABNORMAL MMHG
ARTERIAL PATENCY WRIST A: POSITIVE
ATMOSPHERIC PRESS: 740 MMHG
BASE EXCESS BLDA CALC-SCNC: 8.4 MMOL/L (ref 0–2)
BDY SITE: ABNORMAL
COHGB MFR BLD: <0.7 % (ref 0–2)
GAS FLOW AIRWAY: 5 LPM
HCO3 BLDA-SCNC: 33.4 MMOL/L (ref 22–28)
HCT VFR BLD CALC: 47.4 %
HGB BLDA-MCNC: 15.5 G/DL (ref 12–18)
HOROWITZ INDEX BLD+IHG-RTO: 40 %
METHGB BLD QL: 1.1 % (ref 0–1.5)
MODALITY: ABNORMAL
NOTE: ABNORMAL
OXYHGB MFR BLDV: 91.6 % (ref 94–99)
PCO2 BLDA: 46.1 MM HG (ref 35–45)
PCO2 TEMP ADJ BLD: ABNORMAL MM HG (ref 35–45)
PH BLDA: 7.47 PH UNITS (ref 7.3–7.5)
PH, TEMP CORRECTED: ABNORMAL PH UNITS
PO2 BLDA: 61.1 MM HG (ref 75–100)
PO2 TEMP ADJ BLD: ABNORMAL MM HG (ref 83–108)
SAO2 % BLDCOA: 93.1 % (ref 94–100)
TROPONIN I SERPL-MCNC: 0.07 NG/ML (ref 0–0.03)
VENTILATOR MODE: ABNORMAL

## 2019-01-16 PROCEDURE — 93005 ELECTROCARDIOGRAM TRACING: CPT | Performed by: INTERNAL MEDICINE

## 2019-01-16 PROCEDURE — 25010000002 FUROSEMIDE PER 20 MG: Performed by: INTERNAL MEDICINE

## 2019-01-16 PROCEDURE — 82375 ASSAY CARBOXYHB QUANT: CPT

## 2019-01-16 PROCEDURE — 63710000001 PREDNISONE PER 1 MG: Performed by: INTERNAL MEDICINE

## 2019-01-16 PROCEDURE — 94799 UNLISTED PULMONARY SVC/PX: CPT

## 2019-01-16 PROCEDURE — 82805 BLOOD GASES W/O2 SATURATION: CPT

## 2019-01-16 PROCEDURE — 83050 HGB METHEMOGLOBIN QUAN: CPT

## 2019-01-16 PROCEDURE — 25010000002 ENOXAPARIN PER 10 MG: Performed by: INTERNAL MEDICINE

## 2019-01-16 PROCEDURE — 36600 WITHDRAWAL OF ARTERIAL BLOOD: CPT

## 2019-01-16 PROCEDURE — 84484 ASSAY OF TROPONIN QUANT: CPT | Performed by: INTERNAL MEDICINE

## 2019-01-16 RX ORDER — ALPRAZOLAM 0.5 MG/1
0.5 TABLET ORAL ONCE
Status: COMPLETED | OUTPATIENT
Start: 2019-01-16 | End: 2019-01-16

## 2019-01-16 RX ORDER — ECHINACEA PURPUREA EXTRACT 125 MG
2 TABLET ORAL AS NEEDED
Status: DISCONTINUED | OUTPATIENT
Start: 2019-01-16 | End: 2019-01-17 | Stop reason: HOSPADM

## 2019-01-16 RX ORDER — FUROSEMIDE 10 MG/ML
20 INJECTION INTRAMUSCULAR; INTRAVENOUS ONCE
Status: COMPLETED | OUTPATIENT
Start: 2019-01-16 | End: 2019-01-16

## 2019-01-16 RX ORDER — FUROSEMIDE 20 MG/1
20 TABLET ORAL DAILY
Status: DISCONTINUED | OUTPATIENT
Start: 2019-01-17 | End: 2019-01-17 | Stop reason: HOSPADM

## 2019-01-16 RX ORDER — ALPRAZOLAM 0.5 MG/1
0.5 TABLET ORAL 2 TIMES DAILY
Status: DISCONTINUED | OUTPATIENT
Start: 2019-01-16 | End: 2019-01-17 | Stop reason: HOSPADM

## 2019-01-16 RX ADMIN — SALINE NASAL SPRAY 2 SPRAY: 1.5 SOLUTION NASAL at 00:13

## 2019-01-16 RX ADMIN — LOSARTAN POTASSIUM 50 MG: 50 TABLET, FILM COATED ORAL at 09:51

## 2019-01-16 RX ADMIN — ALPRAZOLAM 0.5 MG: 0.5 TABLET ORAL at 20:43

## 2019-01-16 RX ADMIN — AMLODIPINE BESYLATE 5 MG: 5 TABLET ORAL at 09:51

## 2019-01-16 RX ADMIN — CLOPIDOGREL BISULFATE 75 MG: 75 TABLET ORAL at 09:51

## 2019-01-16 RX ADMIN — IPRATROPIUM BROMIDE AND ALBUTEROL SULFATE 3 ML: .5; 3 SOLUTION RESPIRATORY (INHALATION) at 20:52

## 2019-01-16 RX ADMIN — PREDNISONE 40 MG: 20 TABLET ORAL at 09:51

## 2019-01-16 RX ADMIN — SPIRONOLACTONE 25 MG: 25 TABLET, FILM COATED ORAL at 09:51

## 2019-01-16 RX ADMIN — ENOXAPARIN SODIUM 40 MG: 40 INJECTION SUBCUTANEOUS at 14:00

## 2019-01-16 RX ADMIN — IPRATROPIUM BROMIDE AND ALBUTEROL SULFATE 3 ML: .5; 3 SOLUTION RESPIRATORY (INHALATION) at 07:20

## 2019-01-16 RX ADMIN — IPRATROPIUM BROMIDE AND ALBUTEROL SULFATE 3 ML: .5; 3 SOLUTION RESPIRATORY (INHALATION) at 10:25

## 2019-01-16 RX ADMIN — ALPRAZOLAM 0.5 MG: 0.5 TABLET ORAL at 10:59

## 2019-01-16 RX ADMIN — ATORVASTATIN CALCIUM 80 MG: 80 TABLET, FILM COATED ORAL at 20:43

## 2019-01-16 RX ADMIN — METOPROLOL SUCCINATE 25 MG: 25 TABLET, EXTENDED RELEASE ORAL at 09:51

## 2019-01-16 RX ADMIN — FUROSEMIDE 20 MG: 10 INJECTION, SOLUTION INTRAMUSCULAR; INTRAVENOUS at 15:23

## 2019-01-16 RX ADMIN — ASPIRIN 81 MG: 81 TABLET, COATED ORAL at 09:51

## 2019-01-16 RX ADMIN — NICOTINE 1 PATCH: 14 PATCH TRANSDERMAL at 09:51

## 2019-01-16 RX ADMIN — HYDROCODONE BITARTRATE AND ACETAMINOPHEN 1 TABLET: 5; 325 TABLET ORAL at 00:13

## 2019-01-16 NOTE — PROGRESS NOTES
"   LOS: 2 days   Patient Care Team:  Maria De Jesus Morrell MD as PCP - General (Family Medicine)  Maria De Jesus Morrell MD (Family Medicine)    Interval History: She is been doing marginally better.  The breathing is improved marginally.  Still having trouble lying down flat in bed.        Review of Systems:   Pertinent items are noted in HPI.      Objective     Vital Sign Min/Max for last 24 hours  Temp  Min: 97.3 °F (36.3 °C)  Max: 97.9 °F (36.6 °C)   BP  Min: 115/56  Max: 144/84   Pulse  Min: 79  Max: 98   Resp  Min: 16  Max: 18   SpO2  Min: 90 %  Max: 100 %   Flow (L/min)  Min: 2  Max: 4   Weight  Min: 59.7 kg (131 lb 9.6 oz)  Max: 59.7 kg (131 lb 9.6 oz)     Flowsheet Rows      First Filed Value   Admission Height  170.2 cm (67\") Documented at 01/13/2019 0655   Admission Weight  61.2 kg (135 lb) Documented at 01/13/2019 0655          Physical Exam:     General Appearance:    Alert, cooperative, in no acute distress   Head:    Normocephalic, without obvious abnormality, atraumatic   Eyes:            Lids and lashes normal, conjunctivae and sclerae normal, no   icterus, no pallor, corneas clear, PERRLA   Ears:    Ears appear intact with no abnormalities noted   Throat:   No oral lesions, no thrush, oral mucosa moist   Neck:   No adenopathy, supple, trachea midline, no thyromegaly, no     carotid bruit, no JVD   Back:     No kyphosis present, no scoliosis present, no skin lesions,       erythema or scars, no tenderness to percussion or                   palpation,   range of motion normal   Lungs:   Markedly diminished breath sounds bilaterally.      Heart:    Regular rhythm and normal rate, normal S1 and S2, no            murmur, no gallop, no rub, no click   Breast Exam:    Deferred   Abdomen:     Normal bowel sounds, no masses, no organomegaly, soft        non-tender, non-distended, no guarding, no rebound                 tenderness   Genitalia:    Deferred   Extremities:   Moves all extremities well, no edema, no cyanosis, " no              redness   Pulses:   Pulses palpable and equal bilaterally   Skin:   No bleeding, bruising or rash   Lymph nodes:   No palpable adenopathy   Neurologic:   Cranial nerves 2 - 12 grossly intact, sensation intact, DTR        present and equal bilaterally        Results Review:     I reviewed the patient's new clinical results.    Results Review:   I reviewed the patient's new clinical results.      LAB DATA :     Results from last 7 days   Lab Units  01/13/19   0700   CHOLESTEROL mg/dL  176   TRIGLYCERIDES mg/dL  75   HDL CHOL mg/dL  58   LDL CHOL mg/dL  103*       Laboratory results:    Results from last 7 days   Lab Units  01/15/19   0528  01/14/19   0541  01/13/19   0700   SODIUM mmol/L  133*  134*  134*   POTASSIUM mmol/L  4.4  4.1  4.2   CHLORIDE mmol/L  95*  93*  98   CO2 mmol/L  32.0*  33.0*  29.0   BUN mg/dL  19  17  8   CREATININE mg/dL  0.90  0.80  0.70   CALCIUM mg/dL  9.2  9.5  8.9   BILIRUBIN mg/dL  0.5  0.9  0.8   ALK PHOS U/L  58  68  74   ALT (SGPT) U/L  32  28  24   AST (SGOT) U/L  22  21  21   GLUCOSE mg/dL  97  125*  143*     Results from last 7 days   Lab Units  01/15/19   0528  01/14/19   0541  01/13/19   0700   WBC 10*3/mm3  17.85*  13.70*  8.97   HEMOGLOBIN g/dL  13.9  15.2  14.2   HEMATOCRIT %  42.0  44.7  43.5   PLATELETS 10*3/mm3  385  392  353                 Results from last 7 days   Lab Units  01/14/19   0541   TROPONIN I ng/mL  0.038*             Results from last 7 days   Lab Units  01/13/19   0817   PH, ARTERIAL pH units  7.394   PCO2, ARTERIAL mm Hg  46.7*   PO2 ART mm Hg  71.3*   HCO3 ART mmol/L  28.5*   BASE EXCESS ART mmol/L  2.8*   O2 SATURATION ART %  94.9   HEMOGLOBIN, ARTERIAL g/dL  14.3   HEMATOCRIT, ARTERIAL %  44.0   OXYHEMOGLOBIN %  93.6*   METHEMOGLOBIN %  0.60   CARBOXYHEMOGLOBIN %  0.8   BAROMETRIC PRESSURE FOR BLOOD GAS mmHg  735   MODALITY   Nasal Cannula   FIO2 %  32     Lab Results   Component Value Date    HGBA1C 6.0 01/13/2019     Results from last 7  days   Lab Units  01/13/19   0700   TSH mIU/mL  4.490   FREE T4 ng/dL  1.34           IMAGING DATA:     Xr Chest 2 View    Result Date: 1/13/2019  Narrative: PROCEDURE: XR CHEST 2 VW-   1/13/2019 7:25 AM  HISTORY: soa  COMPARISON:  January 2, 2019  FINDINGS:     The cardiac silhouette is proper size. The aortic contours are normal. The mediastinal and hilar structures are unremarkable. The lungs are clear. There is no pneumothorax.             Impression: No acute cardiopulmonary process.      This report was finalized on 1/13/2019 7:33 AM by Tamara Brownlee MD.    Xr Chest 2 View    Result Date: 1/3/2019  Narrative: PROCEDURE: XR CHEST 2 VW-    HISTORY: CHF/COPD Protocol; J44.1-Chronic obstructive pulmonary disease with (acute) exacerbation  COMPARISON: October 21, 2013.  FINDINGS: The heart is normal in size. The mediastinum is unremarkable. The lungs are clear. There is no pneumothorax. There are no acute osseous abnormalities.         Impression: No acute cardiopulmonary process.    This report was finalized on 1/3/2019 8:15 AM by Natty Duggan M.D..            Assessment/Plan    #1 coronary artery disease:  Has an occluded LAD with disease involving the diagonal and RCA.  Is due for the final pictures of the wire ability study name.  If anterior wall noted to be viable will be sent in for open heart surgery.  She is in agreement with this plan.     #2 LV Dysfunction:  Ischemic in origin.  Today the blood pressures have been stable we'll up titrate the medications as tolerated.     #3 COPD with emphysema:  Severe secondary to concomitant smoking.  On treatments and low-dose steroid therapy at this time.     #4 nicotine abuse:  Importance of staying off cigarettes a explained has been kept on nicotine patch during the hospital stay.            Non-ST elevation (NSTEMI) myocardial infarction (CMS/HCC)    Myocardial infarction (CMS/HCC)            Suraj Quintero MD  01/15/19  8:02 PM

## 2019-01-16 NOTE — PLAN OF CARE
Problem: Cardiac: ACS (Acute Coronary Syndrome) (Adult)  Goal: Signs and Symptoms of Listed Potential Problems Will be Absent, Minimized or Managed (Cardiac: ACS)  Outcome: Ongoing (interventions implemented as appropriate)   01/16/19 0106   Goal/Outcome Evaluation   Problems Assessed (Acute Coronary Syndrome) all   Problems Present (Acute Coronary Syn) dysrhythmia/arrhythmia;situational response

## 2019-01-16 NOTE — PLAN OF CARE
Problem: Patient Care Overview  Goal: Plan of Care Review  Outcome: Ongoing (interventions implemented as appropriate)   01/16/19 0139   Coping/Psychosocial   Plan of Care Reviewed With patient   Plan of Care Review   Progress improving   OTHER   Outcome Summary VSS, 3rd part of cardiac viable study in the am, NSR on tele, may or may not discharge in the am

## 2019-01-17 VITALS
HEART RATE: 100 BPM | TEMPERATURE: 97.7 F | BODY MASS INDEX: 20.43 KG/M2 | RESPIRATION RATE: 20 BRPM | DIASTOLIC BLOOD PRESSURE: 68 MMHG | HEIGHT: 67 IN | SYSTOLIC BLOOD PRESSURE: 129 MMHG | OXYGEN SATURATION: 90 % | WEIGHT: 130.13 LBS

## 2019-01-17 PROBLEM — I50.42 CHRONIC COMBINED SYSTOLIC AND DIASTOLIC CONGESTIVE HEART FAILURE (HCC): Chronic | Status: ACTIVE | Noted: 2019-01-17

## 2019-01-17 PROBLEM — J43.9 PULMONARY EMPHYSEMA: Chronic | Status: ACTIVE | Noted: 2019-01-17

## 2019-01-17 PROBLEM — Z72.0 TOBACCO ABUSE: Chronic | Status: ACTIVE | Noted: 2019-01-17

## 2019-01-17 PROBLEM — F41.9 ANXIETY: Status: ACTIVE | Noted: 2019-01-17

## 2019-01-17 LAB
ANION GAP SERPL CALCULATED.3IONS-SCNC: 10.9 MMOL/L (ref 10–20)
BASOPHILS # BLD AUTO: 0.04 10*3/MM3 (ref 0–0.2)
BASOPHILS NFR BLD AUTO: 0.2 % (ref 0–2.5)
BUN BLD-MCNC: 18 MG/DL (ref 7–20)
BUN/CREAT SERPL: 25.7 (ref 7.1–23.5)
CALCIUM SPEC-SCNC: 9.3 MG/DL (ref 8.4–10.2)
CHLORIDE SERPL-SCNC: 98 MMOL/L (ref 98–107)
CO2 SERPL-SCNC: 32 MMOL/L (ref 26–30)
CREAT BLD-MCNC: 0.7 MG/DL (ref 0.6–1.3)
DEPRECATED RDW RBC AUTO: 43.7 FL (ref 37–54)
EOSINOPHIL # BLD AUTO: 0.03 10*3/MM3 (ref 0–0.7)
EOSINOPHIL NFR BLD AUTO: 0.2 % (ref 0–7)
ERYTHROCYTE [DISTWIDTH] IN BLOOD BY AUTOMATED COUNT: 13.1 % (ref 11.5–14.5)
GFR SERPL CREATININE-BSD FRML MDRD: 82 ML/MIN/1.73
GLUCOSE BLD-MCNC: 93 MG/DL (ref 74–98)
HCT VFR BLD AUTO: 44.6 % (ref 37–47)
HGB BLD-MCNC: 14.8 G/DL (ref 12–16)
IMM GRANULOCYTES # BLD AUTO: 0.11 10*3/MM3 (ref 0–0.06)
IMM GRANULOCYTES NFR BLD AUTO: 0.6 % (ref 0–0.6)
LYMPHOCYTES # BLD AUTO: 3.45 10*3/MM3 (ref 0.6–3.4)
LYMPHOCYTES NFR BLD AUTO: 18.4 % (ref 10–50)
MCH RBC QN AUTO: 30.2 PG (ref 27–31)
MCHC RBC AUTO-ENTMCNC: 33.2 G/DL (ref 30–37)
MCV RBC AUTO: 91 FL (ref 81–99)
MONOCYTES # BLD AUTO: 1.41 10*3/MM3 (ref 0–0.9)
MONOCYTES NFR BLD AUTO: 7.5 % (ref 0–12)
NEUTROPHILS # BLD AUTO: 13.66 10*3/MM3 (ref 2–6.9)
NEUTROPHILS NFR BLD AUTO: 73.1 % (ref 37–80)
NRBC BLD AUTO-RTO: 0 /100 WBC (ref 0–0)
NT-PROBNP SERPL-MCNC: 1040 PG/ML (ref 0–125)
PLATELET # BLD AUTO: 394 10*3/MM3 (ref 130–400)
PMV BLD AUTO: 10.4 FL (ref 6–12)
POTASSIUM BLD-SCNC: 3.9 MMOL/L (ref 3.5–5.1)
RBC # BLD AUTO: 4.9 10*6/MM3 (ref 4.2–5.4)
SODIUM BLD-SCNC: 137 MMOL/L (ref 137–145)
TROPONIN I SERPL-MCNC: 0.09 NG/ML (ref 0–0.03)
WBC NRBC COR # BLD: 18.7 10*3/MM3 (ref 4.8–10.8)

## 2019-01-17 PROCEDURE — 94799 UNLISTED PULMONARY SVC/PX: CPT

## 2019-01-17 PROCEDURE — 85025 COMPLETE CBC W/AUTO DIFF WBC: CPT | Performed by: INTERNAL MEDICINE

## 2019-01-17 PROCEDURE — 80048 BASIC METABOLIC PNL TOTAL CA: CPT | Performed by: INTERNAL MEDICINE

## 2019-01-17 PROCEDURE — 84484 ASSAY OF TROPONIN QUANT: CPT | Performed by: INTERNAL MEDICINE

## 2019-01-17 PROCEDURE — 83880 ASSAY OF NATRIURETIC PEPTIDE: CPT | Performed by: INTERNAL MEDICINE

## 2019-01-17 PROCEDURE — 63710000001 PREDNISONE PER 1 MG: Performed by: INTERNAL MEDICINE

## 2019-01-17 RX ORDER — AMLODIPINE BESYLATE 5 MG/1
5 TABLET ORAL
Qty: 30 TABLET | Refills: 1 | Status: SHIPPED | OUTPATIENT
Start: 2019-01-18

## 2019-01-17 RX ORDER — METOPROLOL SUCCINATE 25 MG/1
25 TABLET, EXTENDED RELEASE ORAL
Qty: 30 TABLET | Refills: 3 | Status: SHIPPED | OUTPATIENT
Start: 2019-01-18

## 2019-01-17 RX ORDER — ATORVASTATIN CALCIUM 80 MG/1
80 TABLET, FILM COATED ORAL NIGHTLY
Qty: 30 TABLET | Refills: 0 | Status: SHIPPED | OUTPATIENT
Start: 2019-01-17

## 2019-01-17 RX ORDER — NICOTINE 21 MG/24HR
1 PATCH, TRANSDERMAL 24 HOURS TRANSDERMAL
Qty: 28 PATCH | Refills: 0 | Status: SHIPPED | OUTPATIENT
Start: 2019-01-18

## 2019-01-17 RX ORDER — CLOPIDOGREL BISULFATE 75 MG/1
75 TABLET ORAL DAILY
Qty: 30 TABLET | Refills: 3 | Status: SHIPPED | OUTPATIENT
Start: 2019-01-18

## 2019-01-17 RX ORDER — PREDNISONE 20 MG/1
40 TABLET ORAL DAILY
Qty: 30 TABLET | Refills: 3 | Status: SHIPPED | OUTPATIENT
Start: 2019-01-18

## 2019-01-17 RX ORDER — FUROSEMIDE 20 MG/1
20 TABLET ORAL DAILY
Qty: 30 TABLET | Refills: 0 | Status: SHIPPED | OUTPATIENT
Start: 2019-01-18

## 2019-01-17 RX ORDER — ASPIRIN 81 MG/1
81 TABLET ORAL DAILY
Qty: 30 TABLET | Refills: 3 | Status: SHIPPED | OUTPATIENT
Start: 2019-01-18

## 2019-01-17 RX ORDER — LOSARTAN POTASSIUM 50 MG/1
50 TABLET ORAL
Qty: 30 TABLET | Refills: 3 | Status: SHIPPED | OUTPATIENT
Start: 2019-01-18

## 2019-01-17 RX ORDER — SPIRONOLACTONE 25 MG/1
25 TABLET ORAL DAILY
Qty: 30 TABLET | Refills: 3 | Status: SHIPPED | OUTPATIENT
Start: 2019-01-18

## 2019-01-17 RX ADMIN — ASPIRIN 81 MG: 81 TABLET, COATED ORAL at 08:39

## 2019-01-17 RX ADMIN — ALPRAZOLAM 0.5 MG: 0.5 TABLET ORAL at 08:39

## 2019-01-17 RX ADMIN — FUROSEMIDE 20 MG: 20 TABLET ORAL at 08:39

## 2019-01-17 RX ADMIN — IPRATROPIUM BROMIDE AND ALBUTEROL SULFATE 3 ML: .5; 3 SOLUTION RESPIRATORY (INHALATION) at 07:13

## 2019-01-17 RX ADMIN — PREDNISONE 40 MG: 20 TABLET ORAL at 08:39

## 2019-01-17 RX ADMIN — LOSARTAN POTASSIUM 50 MG: 50 TABLET, FILM COATED ORAL at 08:39

## 2019-01-17 RX ADMIN — AMLODIPINE BESYLATE 5 MG: 5 TABLET ORAL at 08:39

## 2019-01-17 RX ADMIN — SPIRONOLACTONE 25 MG: 25 TABLET, FILM COATED ORAL at 08:39

## 2019-01-17 RX ADMIN — NICOTINE 1 PATCH: 14 PATCH TRANSDERMAL at 08:40

## 2019-01-17 RX ADMIN — CLOPIDOGREL BISULFATE 75 MG: 75 TABLET ORAL at 08:39

## 2019-01-17 RX ADMIN — METOPROLOL SUCCINATE 25 MG: 25 TABLET, EXTENDED RELEASE ORAL at 08:39

## 2019-01-17 RX ADMIN — SERTRALINE HYDROCHLORIDE 50 MG: 50 TABLET ORAL at 08:39

## 2019-01-17 NOTE — PROGRESS NOTES
"   LOS: 3 days   Patient Care Team:  Maria De Jesus Morrell MD as PCP - General (Family Medicine)  Maria De Jesus Morrell MD (Family Medicine)      Interval History: Patient has been intermittently very anxious.  Has had trouble getting her Viability study.  He is very troubled at this time.  Wants to know what can be done about her health.        Review of Systems:   Pertinent items are noted in HPI.      Objective     Vital Sign Min/Max for last 24 hours  Temp  Min: 96.7 °F (35.9 °C)  Max: 98.2 °F (36.8 °C)   BP  Min: 107/61  Max: 142/74   Pulse  Min: 68  Max: 82   Resp  Min: 17  Max: 20   SpO2  Min: 90 %  Max: 96 %   Flow (L/min)  Min: 2  Max: 3   Weight  Min: 60.6 kg (133 lb 11.2 oz)  Max: 60.6 kg (133 lb 11.2 oz)     Flowsheet Rows      First Filed Value   Admission Height  170.2 cm (67\") Documented at 01/13/2019 0655   Admission Weight  61.2 kg (135 lb) Documented at 01/13/2019 0655          Physical Exam:     General Appearance:    Alert, cooperative, in no acute distress   Head:    Normocephalic, without obvious abnormality, atraumatic   Eyes:            Lids and lashes normal, conjunctivae and sclerae normal, no   icterus, no pallor, corneas clear, PERRLA   Ears:    Ears appear intact with no abnormalities noted   Throat:   No oral lesions, no thrush, oral mucosa moist   Neck:   No adenopathy, supple, trachea midline, no thyromegaly, no     carotid bruit, no JVD   Back:     No kyphosis present, no scoliosis present, no skin lesions,       erythema or scars, no tenderness to percussion or                   palpation,   range of motion normal   Lungs:   Markedly diminished breath sounds bilaterally.      Heart:    Regular rhythm and normal rate, normal S1 and S2, no            murmur, no gallop, no rub, no click   Breast Exam:    Deferred   Abdomen:     Normal bowel sounds, no masses, no organomegaly, soft        non-tender, non-distended, no guarding, no rebound                 tenderness   Genitalia:    Deferred "   Extremities:   Moves all extremities well, no edema, no cyanosis, no              redness   Pulses:   Pulses palpable and equal bilaterally   Skin:   No bleeding, bruising or rash   Lymph nodes:   No palpable adenopathy   Neurologic:   Cranial nerves 2 - 12 grossly intact, sensation intact, DTR        present and equal bilaterally        Results Review:     I reviewed the patient's new clinical results.    Results Review:   I reviewed the patient's new clinical results.        LAB DATA :     Results from last 7 days   Lab Units 01/13/19  0700   CHOLESTEROL mg/dL 176   TRIGLYCERIDES mg/dL 75   HDL CHOL mg/dL 58   LDL CHOL mg/dL 103*       Laboratory results:    Results from last 7 days   Lab Units 01/15/19  0528 01/14/19  0541 01/13/19  0700   SODIUM mmol/L 133* 134* 134*   POTASSIUM mmol/L 4.4 4.1 4.2   CHLORIDE mmol/L 95* 93* 98   CO2 mmol/L 32.0* 33.0* 29.0   BUN mg/dL 19 17 8   CREATININE mg/dL 0.90 0.80 0.70   CALCIUM mg/dL 9.2 9.5 8.9   BILIRUBIN mg/dL 0.5 0.9 0.8   ALK PHOS U/L 58 68 74   ALT (SGPT) U/L 32 28 24   AST (SGOT) U/L 22 21 21   GLUCOSE mg/dL 97 125* 143*     Results from last 7 days   Lab Units 01/15/19  0528 01/14/19  0541 01/13/19  0700   WBC 10*3/mm3 17.85* 13.70* 8.97   HEMOGLOBIN g/dL 13.9 15.2 14.2   HEMATOCRIT % 42.0 44.7 43.5   PLATELETS 10*3/mm3 385 392 353                 Results from last 7 days   Lab Units 01/16/19  1524   TROPONIN I ng/mL 0.074*             Results from last 7 days   Lab Units 01/16/19  1757   PH, ARTERIAL pH units 7.469   PCO2, ARTERIAL mm Hg 46.1*   PO2 ART mm Hg 61.1*   HCO3 ART mmol/L 33.4*   BASE EXCESS ART mmol/L 8.4*   O2 SATURATION ART % 93.1*   HEMOGLOBIN, ARTERIAL g/dL 15.5   HEMATOCRIT, ARTERIAL % 47.4   OXYHEMOGLOBIN % 91.6*   METHEMOGLOBIN % 1.10   CARBOXYHEMOGLOBIN % <0.7   BAROMETRIC PRESSURE FOR BLOOD GAS mmHg 740   MODALITY  Nasal Cannula   FIO2 % 40     Lab Results   Component Value Date    HGBA1C 6.0 01/13/2019     Results from last 7 days   Lab  Units 01/13/19  0700   TSH mIU/mL 4.490   FREE T4 ng/dL 1.34           IMAGING DATA:     Xr Chest 2 View    Result Date: 1/13/2019  Narrative: PROCEDURE: XR CHEST 2 VW-   1/13/2019 7:25 AM  HISTORY: soa  COMPARISON:  January 2, 2019  FINDINGS:     The cardiac silhouette is proper size. The aortic contours are normal. The mediastinal and hilar structures are unremarkable. The lungs are clear. There is no pneumothorax.             Impression: No acute cardiopulmonary process.      This report was finalized on 1/13/2019 7:33 AM by Tamara Brownlee MD.    Xr Chest 2 View    Result Date: 1/3/2019  Narrative: PROCEDURE: XR CHEST 2 VW-    HISTORY: CHF/COPD Protocol; J44.1-Chronic obstructive pulmonary disease with (acute) exacerbation  COMPARISON: October 21, 2013.  FINDINGS: The heart is normal in size. The mediastinum is unremarkable. The lungs are clear. There is no pneumothorax. There are no acute osseous abnormalities.         Impression: No acute cardiopulmonary process.    This report was finalized on 1/3/2019 8:15 AM by Natty Duggan M.D..            Assessment/Plan    #1 coronary artery disease:  Has significant disease involving the ostial LAD mid LAD diagonal 1 as well as RCA.  Discussed the issue with Dr. Brenton Moyer from [facility] who is open to the idea of attempting reopening of this mid LAD.  Tentatively being planned for transfer out to [facility] for the same.  Patient is in agreement with this plan.    #2 LV dysfunction:  Acute/subacute systolic and diastolic ischemic in origin.  We'll continue to up titrate medications as she can tolerate.  Wire.  This study is not too impressive.  May need an MRI for definite evaluation.  Definitely if surgery is being planned.    #3 COPD with emphysema:  Almost appears endstage has not had prior workup.  Has been on low-dose steroid therapy with Treatment will continue the same.    #4 active nicotine abuse:  Is related to quit the cigarettes at this  time is been kept on a nicotine patch.  Not sure if she is going through withdrawal at this time.    #5 hypoxemia:  Has been on home oxygen at home at 3 L/m the same is being continued.  The ABG looks reasonable.    #6 stress/anxiety disorder:  Appears to have a major compound of the same.  Will start anxiety medications as well as keep her on Xanax 0.5 mg twice daily.    Have had a detailed discussion with the patient and family by the bedside.  They're aware of all the Risks Involved and Options.  They're Open to the Idea [facility] Attempt with Respect to Mid LAD.      Non-ST elevation (NSTEMI) myocardial infarction (CMS/HCC)    Myocardial infarction (CMS/HCC)          Suraj Quintero MD  01/16/19  7:22 PM

## 2019-01-17 NOTE — DISCHARGE SUMMARY
Date of Discharge:  1/17/2019    Discharge Diagnosis:     #1 non-Q myocardial infarction: Inability to open and close to LAD.    #2 LV dysfunction-combined systolic and diastolic subacute to chronic ischemic in origin.    #3 anxiety disorder.    #4 dyslipidemia.    #5 COPD with emphysema end-stage.    #6 active nicotine abuse.          Hospital Course  Patient is a 73 y.o. female presented with shortness of breath and failure to thrive for the last one month.  On evaluation with a bedside echocardiogram at the patient was noted to have markedly diminished LV systolic function with akinesis of the mid and apical segments.  Initial thought of possible stress-induced cardiomyopathy was entertained patient was admitted.  Nevertheless the subsequent troponin started rising.  Invasive evaluation revealed an occluded LAD and critical disease involving the ostial LAD diagonal 1 as well as the RCA.  Attempts were made to open this mid LAD H unsuccessful due to 3 separate branches coming off at the site of closure.  After a few tries this procedure was abandoned.  Patient went on to have a Y ability study after that which revealed some viability involving the septum and anterior wall of the LV.  After much discussion patient is in agreement with making giving a second try for the procedure and reopening of this LAD had [facility] center.  Discussed with Dr. Brenton Penny at [facility] who is open to the idea of attempting reopening of this what appears to be a chronic total occlusion.  Patient is being transferred under the care of Dr. Penny for the same purpose.    Patient has end-stage COPD with emphysema and has been under treatments but still continued to have shortness of breath because of which was kept on steroid therapy also during the hospital stay.  There is no sign of infection.    Patient is an active smoker has been kept on nicotine patch.  She is not very sure she can come off the cigarettes on the  longer and.    There is a major compound of anxiety also.  She responded well to Xanax therapy the same was maintained.    Her LV Systolic Function Appears to Be Markedly Diminished with the EF of 25-30%.  The Wall Motion Was Also Significant.  There Is Some Y Ability in This Segment Patient's Options of Possible Surgical Correction Versus High Risk PCI Was Discussed.  After Much Discussion Patient Is Being Transferred for High Risk PCI at [facility].    Procedures Performed  Procedure(s):  Left Heart Cath       Consults:   Consults     No orders found from 12/15/2018 to 1/14/2019.              Condition on Discharge:     Vital Signs  Temp:  [96.4 °F (35.8 °C)-98.2 °F (36.8 °C)] 97.7 °F (36.5 °C)  Heart Rate:  [] 100  Resp:  [17-22] 20  BP: (107-131)/(58-95) 129/68    Physical Exam:     General Appearance:    Alert, cooperative, in no acute distress   Head:    Normocephalic, without obvious abnormality, atraumatic   Eyes:            Lids and lashes normal, conjunctivae and sclerae normal, no   icterus, no pallor, corneas clear, PERRLA   Ears:    Ears appear intact with no abnormalities noted   Throat:   No oral lesions, no thrush, oral mucosa moist   Neck:   No adenopathy, supple, trachea midline, no thyromegaly, no     carotid bruit, no JVD   Back:     No kyphosis present, no scoliosis present, no skin lesions,       erythema or scars, no tenderness to percussion or                   palpation,   range of motion normal   Lungs:     Diminished air entry     Heart:    Regular rhythm and normal rate, normal S1 and S2, no            murmur, no gallop, no rub, no click   Breast Exam:    Deferred   Abdomen:     Normal bowel sounds, no masses, no organomegaly, soft        non-tender, non-distended, no guarding, no rebound                 tenderness   Genitalia:    Deferred   Extremities:   Moves all extremities well, no edema, no cyanosis, no              redness   Pulses:   Pulses palpable and equal  bilaterally   Skin:   No bleeding, bruising or rash   Lymph nodes:   No palpable adenopathy   Neurologic:   Cranial nerves 2 - 12 grossly intact, sensation intact, DTR        present and equal bilaterally       LAB DATA :     Results from last 7 days   Lab Units 01/13/19  0700   CHOLESTEROL mg/dL 176   TRIGLYCERIDES mg/dL 75   HDL CHOL mg/dL 58   LDL CHOL mg/dL 103*       Laboratory results:    Results from last 7 days   Lab Units 01/17/19  0659 01/15/19  0528 01/14/19  0541 01/13/19  0700   SODIUM mmol/L 137 133* 134* 134*   POTASSIUM mmol/L 3.9 4.4 4.1 4.2   CHLORIDE mmol/L 98 95* 93* 98   CO2 mmol/L 32.0* 32.0* 33.0* 29.0   BUN mg/dL 18 19 17 8   CREATININE mg/dL 0.70 0.90 0.80 0.70   CALCIUM mg/dL 9.3 9.2 9.5 8.9   BILIRUBIN mg/dL  --  0.5 0.9 0.8   ALK PHOS U/L  --  58 68 74   ALT (SGPT) U/L  --  32 28 24   AST (SGOT) U/L  --  22 21 21   GLUCOSE mg/dL 93 97 125* 143*     Results from last 7 days   Lab Units 01/17/19  0659 01/15/19  0528 01/14/19  0541 01/13/19  0700   WBC 10*3/mm3 18.70* 17.85* 13.70* 8.97   HEMOGLOBIN g/dL 14.8 13.9 15.2 14.2   HEMATOCRIT % 44.6 42.0 44.7 43.5   PLATELETS 10*3/mm3 394 385 392 353                 Results from last 7 days   Lab Units 01/17/19  0659   TROPONIN I ng/mL 0.089*             Results from last 7 days   Lab Units 01/16/19  1757   PH, ARTERIAL pH units 7.469   PCO2, ARTERIAL mm Hg 46.1*   PO2 ART mm Hg 61.1*   HCO3 ART mmol/L 33.4*   BASE EXCESS ART mmol/L 8.4*   O2 SATURATION ART % 93.1*   HEMOGLOBIN, ARTERIAL g/dL 15.5   HEMATOCRIT, ARTERIAL % 47.4   OXYHEMOGLOBIN % 91.6*   METHEMOGLOBIN % 1.10   CARBOXYHEMOGLOBIN % <0.7   BAROMETRIC PRESSURE FOR BLOOD GAS mmHg 740   MODALITY  Nasal Cannula   FIO2 % 40     Lab Results   Component Value Date    HGBA1C 6.0 01/13/2019     Results from last 7 days   Lab Units 01/13/19  0700   TSH mIU/mL 4.490   FREE T4 ng/dL 1.34           IMAGING DATA:     Xr Chest 2 View    Result Date: 1/13/2019  Narrative: PROCEDURE: XR CHEST 2 VW-    1/13/2019 7:25 AM  HISTORY: soa  COMPARISON:  January 2, 2019  FINDINGS:     The cardiac silhouette is proper size. The aortic contours are normal. The mediastinal and hilar structures are unremarkable. The lungs are clear. There is no pneumothorax.             Impression: No acute cardiopulmonary process.      This report was finalized on 1/13/2019 7:33 AM by Tamara Brownlee MD.    Xr Chest 2 View    Result Date: 1/3/2019  Narrative: PROCEDURE: XR CHEST 2 VW-    HISTORY: CHF/COPD Protocol; J44.1-Chronic obstructive pulmonary disease with (acute) exacerbation  COMPARISON: October 21, 2013.  FINDINGS: The heart is normal in size. The mediastinum is unremarkable. The lungs are clear. There is no pneumothorax. There are no acute osseous abnormalities.         Impression: No acute cardiopulmonary process.    This report was finalized on 1/3/2019 8:15 AM by Natty Duggan M.D..      Discharge Disposition  Short Term Hospital (DC - External)    Discharge Medications     Discharge Medications      New Medications      Instructions Start Date   amLODIPine 5 MG tablet  Commonly known as:  NORVASC   5 mg, Oral, Every 24 Hours Scheduled      aspirin 81 MG EC tablet   81 mg, Oral, Daily      atorvastatin 80 MG tablet  Commonly known as:  LIPITOR   80 mg, Oral, Nightly      clopidogrel 75 MG tablet  Commonly known as:  PLAVIX   75 mg, Oral, Daily      furosemide 20 MG tablet  Commonly known as:  LASIX   20 mg, Oral, Daily      losartan 50 MG tablet  Commonly known as:  COZAAR   50 mg, Oral, Every 24 Hours Scheduled      metoprolol succinate XL 25 MG 24 hr tablet  Commonly known as:  TOPROL-XL   25 mg, Oral, Every 24 Hours Scheduled      nicotine 14 MG/24HR patch  Commonly known as:  NICODERM CQ   1 patch, Transdermal, Every 24 Hours Scheduled      predniSONE 20 MG tablet  Commonly known as:  DELTASONE   40 mg, Oral, Daily      sertraline 50 MG tablet  Commonly known as:  ZOLOFT   50 mg, Oral, Daily      spironolactone 25 MG  tablet  Commonly known as:  ALDACTONE   25 mg, Oral, Daily         Continue These Medications      Instructions Start Date   benzonatate 100 MG capsule  Commonly known as:  TESSALON   100 mg, Oral, 3 Times Daily PRN      cetirizine 10 MG tablet  Commonly known as:  zyrTEC   10 mg, Oral, Daily      fluticasone 50 MCG/ACT nasal spray  Commonly known as:  FLONASE   2 sprays, Nasal, Daily      ipratropium-albuterol 0.5-2.5 mg/3 ml nebulizer  Commonly known as:  DUO-NEB   3 mL, Nebulization, Every 4 Hours PRN      montelukast 10 MG tablet  Commonly known as:  SINGULAIR   10 mg, Oral, Nightly      tiotropium 18 MCG per inhalation capsule  Commonly known as:  SPIRIVA   1 capsule, Inhalation, Daily - RT         Stop These Medications    carvedilol 3.125 MG tablet  Commonly known as:  COREG            Discharge Diet:    Cardiac     Activity at Discharge:   As tolerated   Follow-up Appointments  No future appointments.    After inpatient care at    Test Results Pending at Discharge       Suraj Quintero MD  01/17/19  9:15 AM

## 2019-01-17 NOTE — PROGRESS NOTES
Continued Stay Note  SILVERIO Herrera     Patient Name: Ijeoma Palacios  MRN: [id number]  Today's Date: 1/17/2019    Admit Date: 1/13/2019    Discharge Plan     Row Name 01/17/19 1039       Plan    Final Note  [facility]     Row Name 01/17/19 1038       Plan    Final Discharge Disposition Code  02 - short term hospital for IP care    Row Name 01/17/19 0929       Plan    Final Discharge Disposition Code  02 - short term hospital for IP care        Discharge Codes    No documentation.       Expected Discharge Date and Time     Expected Discharge Date Expected Discharge Time    Jan 17, 2019             Keesha Molina

## 2019-06-17 ENCOUNTER — LAB (OUTPATIENT)
Dept: LAB | Facility: HOSPITAL | Age: 74
End: 2019-06-17

## 2019-06-17 ENCOUNTER — TRANSCRIBE ORDERS (OUTPATIENT)
Dept: LAB | Facility: HOSPITAL | Age: 74
End: 2019-06-17

## 2019-06-17 DIAGNOSIS — I10 ESSENTIAL HYPERTENSION, MALIGNANT: Primary | ICD-10-CM

## 2019-06-17 DIAGNOSIS — I10 ESSENTIAL HYPERTENSION, MALIGNANT: ICD-10-CM

## 2019-06-17 LAB
ALBUMIN SERPL-MCNC: 4.3 G/DL (ref 3.5–5)
ALBUMIN/GLOB SERPL: 1.4 G/DL (ref 1–2)
ALP SERPL-CCNC: 67 U/L (ref 38–126)
ALT SERPL W P-5'-P-CCNC: 25 U/L (ref 13–69)
ANION GAP SERPL CALCULATED.3IONS-SCNC: 12.6 MMOL/L (ref 10–20)
AST SERPL-CCNC: 25 U/L (ref 15–46)
BILIRUB SERPL-MCNC: 0.5 MG/DL (ref 0.2–1.3)
BUN BLD-MCNC: 7 MG/DL (ref 7–20)
BUN/CREAT SERPL: 10 (ref 7.1–23.5)
CALCIUM SPEC-SCNC: 9.3 MG/DL (ref 8.4–10.2)
CHLORIDE SERPL-SCNC: 97 MMOL/L (ref 98–107)
CHOLEST SERPL-MCNC: 93 MG/DL (ref 0–199)
CO2 SERPL-SCNC: 30 MMOL/L (ref 26–30)
CREAT BLD-MCNC: 0.7 MG/DL (ref 0.6–1.3)
DEPRECATED RDW RBC AUTO: 42.5 FL (ref 37–54)
ERYTHROCYTE [DISTWIDTH] IN BLOOD BY AUTOMATED COUNT: 13 % (ref 12.3–15.4)
GFR SERPL CREATININE-BSD FRML MDRD: 82 ML/MIN/1.73
GLOBULIN UR ELPH-MCNC: 3 GM/DL
GLUCOSE BLD-MCNC: 95 MG/DL (ref 74–98)
HCT VFR BLD AUTO: 37.2 % (ref 34–46.6)
HDLC SERPL-MCNC: 51 MG/DL (ref 40–60)
HGB BLD-MCNC: 12.5 G/DL (ref 12–15.9)
LDLC SERPL CALC-MCNC: 27 MG/DL (ref 0–99)
LDLC/HDLC SERPL: 0.53 {RATIO}
MCH RBC QN AUTO: 30.6 PG (ref 26.6–33)
MCHC RBC AUTO-ENTMCNC: 33.6 G/DL (ref 31.5–35.7)
MCV RBC AUTO: 91 FL (ref 79–97)
PLATELET # BLD AUTO: 294 10*3/MM3 (ref 140–450)
PMV BLD AUTO: 10.8 FL (ref 6–12)
POTASSIUM BLD-SCNC: 4.6 MMOL/L (ref 3.5–5.1)
PROT SERPL-MCNC: 7.3 G/DL (ref 6.3–8.2)
RBC # BLD AUTO: 4.09 10*6/MM3 (ref 3.77–5.28)
SODIUM BLD-SCNC: 135 MMOL/L (ref 137–145)
TRIGL SERPL-MCNC: 76 MG/DL
VLDLC SERPL-MCNC: 15.2 MG/DL
WBC NRBC COR # BLD: 8.46 10*3/MM3 (ref 3.4–10.8)

## 2019-06-17 PROCEDURE — 80061 LIPID PANEL: CPT

## 2019-06-17 PROCEDURE — 80053 COMPREHEN METABOLIC PANEL: CPT

## 2019-06-17 PROCEDURE — 36415 COLL VENOUS BLD VENIPUNCTURE: CPT

## 2019-06-17 PROCEDURE — 85027 COMPLETE CBC AUTOMATED: CPT

## 2019-12-03 ENCOUNTER — TRANSCRIBE ORDERS (OUTPATIENT)
Dept: ADMINISTRATIVE | Facility: HOSPITAL | Age: 74
End: 2019-12-03

## 2019-12-03 ENCOUNTER — LAB (OUTPATIENT)
Dept: LAB | Facility: HOSPITAL | Age: 74
End: 2019-12-03

## 2019-12-03 ENCOUNTER — HOSPITAL ENCOUNTER (OUTPATIENT)
Dept: GENERAL RADIOLOGY | Facility: HOSPITAL | Age: 74
Discharge: HOME | End: 2019-12-03
Admitting: INTERNAL MEDICINE

## 2019-12-03 DIAGNOSIS — E78.00 PURE HYPERCHOLESTEROLEMIA: ICD-10-CM

## 2019-12-03 DIAGNOSIS — I10 ESSENTIAL HYPERTENSION, MALIGNANT: Primary | ICD-10-CM

## 2019-12-03 DIAGNOSIS — I25.10 DISEASE OF CARDIOVASCULAR SYSTEM: ICD-10-CM

## 2019-12-03 DIAGNOSIS — E11.9 DIABETES MELLITUS WITHOUT COMPLICATION (HCC): ICD-10-CM

## 2019-12-03 DIAGNOSIS — I10 ESSENTIAL HYPERTENSION, MALIGNANT: ICD-10-CM

## 2019-12-03 DIAGNOSIS — J44.9 OBSTRUCTIVE CHRONIC BRONCHITIS WITHOUT EXACERBATION (HCC): ICD-10-CM

## 2019-12-03 LAB
ANION GAP SERPL CALCULATED.3IONS-SCNC: 15.1 MMOL/L (ref 5–15)
BUN BLD-MCNC: 11 MG/DL (ref 8–23)
BUN/CREAT SERPL: 13.8 (ref 7–25)
CALCIUM SPEC-SCNC: 9.9 MG/DL (ref 8.6–10.5)
CHLORIDE SERPL-SCNC: 98 MMOL/L (ref 98–107)
CHOLEST SERPL-MCNC: 90 MG/DL (ref 0–200)
CO2 SERPL-SCNC: 25.9 MMOL/L (ref 22–29)
CREAT BLD-MCNC: 0.8 MG/DL (ref 0.57–1)
GFR SERPL CREATININE-BSD FRML MDRD: 70 ML/MIN/1.73
GLUCOSE BLD-MCNC: 98 MG/DL (ref 65–99)
HBA1C MFR BLD: 6.1 % (ref 4.8–5.6)
HDLC SERPL-MCNC: 54 MG/DL (ref 40–60)
LDLC SERPL CALC-MCNC: 21 MG/DL (ref 0–100)
LDLC/HDLC SERPL: 0.39 {RATIO}
MAGNESIUM SERPL-MCNC: 2.1 MG/DL (ref 1.6–2.4)
POTASSIUM BLD-SCNC: 4.1 MMOL/L (ref 3.5–5.2)
SODIUM BLD-SCNC: 139 MMOL/L (ref 136–145)
TRIGL SERPL-MCNC: 76 MG/DL (ref 0–150)
TSH SERPL DL<=0.05 MIU/L-ACNC: 4.24 UIU/ML (ref 0.27–4.2)
VLDLC SERPL-MCNC: 15.2 MG/DL (ref 5–40)

## 2019-12-03 PROCEDURE — 80048 BASIC METABOLIC PNL TOTAL CA: CPT

## 2019-12-03 PROCEDURE — 83735 ASSAY OF MAGNESIUM: CPT

## 2019-12-03 PROCEDURE — 80076 HEPATIC FUNCTION PANEL: CPT

## 2019-12-03 PROCEDURE — 80061 LIPID PANEL: CPT | Performed by: INTERNAL MEDICINE

## 2019-12-03 PROCEDURE — 71046 X-RAY EXAM CHEST 2 VIEWS: CPT

## 2019-12-03 PROCEDURE — 36415 COLL VENOUS BLD VENIPUNCTURE: CPT

## 2019-12-03 PROCEDURE — 84443 ASSAY THYROID STIM HORMONE: CPT | Performed by: INTERNAL MEDICINE

## 2019-12-03 PROCEDURE — 83036 HEMOGLOBIN GLYCOSYLATED A1C: CPT | Performed by: INTERNAL MEDICINE

## 2019-12-04 ENCOUNTER — APPOINTMENT (OUTPATIENT)
Dept: GENERAL RADIOLOGY | Facility: HOSPITAL | Age: 74
End: 2019-12-04

## 2019-12-04 ENCOUNTER — HOSPITAL ENCOUNTER (EMERGENCY)
Facility: HOSPITAL | Age: 74
Discharge: HOME | End: 2019-12-04
Attending: EMERGENCY MEDICINE | Admitting: EMERGENCY MEDICINE

## 2019-12-04 VITALS
BODY MASS INDEX: 25.3 KG/M2 | OXYGEN SATURATION: 94 % | RESPIRATION RATE: 18 BRPM | SYSTOLIC BLOOD PRESSURE: 139 MMHG | DIASTOLIC BLOOD PRESSURE: 65 MMHG | WEIGHT: 161.2 LBS | HEIGHT: 67 IN | HEART RATE: 71 BPM | TEMPERATURE: 98.2 F

## 2019-12-04 DIAGNOSIS — J44.1 COPD EXACERBATION (HCC): Primary | ICD-10-CM

## 2019-12-04 LAB
A-A DO2: 62.2 MMHG
ALBUMIN SERPL-MCNC: 4.3 G/DL (ref 3.5–5.2)
ALBUMIN SERPL-MCNC: 4.8 G/DL (ref 3.5–5.2)
ALBUMIN/GLOB SERPL: 1.5 G/DL
ALP SERPL-CCNC: 78 U/L (ref 39–117)
ALP SERPL-CCNC: 78 U/L (ref 39–117)
ALT SERPL W P-5'-P-CCNC: 13 U/L (ref 1–33)
ALT SERPL W P-5'-P-CCNC: 13 U/L (ref 1–33)
ANION GAP SERPL CALCULATED.3IONS-SCNC: 12.9 MMOL/L (ref 5–15)
ARTERIAL PATENCY WRIST A: POSITIVE
AST SERPL-CCNC: 15 U/L (ref 1–32)
AST SERPL-CCNC: 15 U/L (ref 1–32)
ATMOSPHERIC PRESS: 729 MMHG
BASE EXCESS BLDA CALC-SCNC: 2.6 MMOL/L (ref 0–2)
BASOPHILS # BLD AUTO: 0.06 10*3/MM3 (ref 0–0.2)
BASOPHILS NFR BLD AUTO: 0.7 % (ref 0–1.5)
BDY SITE: ABNORMAL
BILIRUB CONJ SERPL-MCNC: 0.2 MG/DL (ref 0.2–0.3)
BILIRUB INDIRECT SERPL-MCNC: 0.2 MG/DL
BILIRUB SERPL-MCNC: 0.4 MG/DL (ref 0.2–1.2)
BILIRUB SERPL-MCNC: 0.5 MG/DL (ref 0.2–1.2)
BUN BLD-MCNC: 11 MG/DL (ref 8–23)
BUN/CREAT SERPL: 13.9 (ref 7–25)
CALCIUM SPEC-SCNC: 9.8 MG/DL (ref 8.6–10.5)
CHLORIDE SERPL-SCNC: 98 MMOL/L (ref 98–107)
CO2 SERPL-SCNC: 27.1 MMOL/L (ref 22–29)
COHGB MFR BLD: <0.6 % (ref 0–2)
CREAT BLD-MCNC: 0.79 MG/DL (ref 0.57–1)
DEPRECATED RDW RBC AUTO: 44.5 FL (ref 37–54)
EOSINOPHIL # BLD AUTO: 0.51 10*3/MM3 (ref 0–0.4)
EOSINOPHIL NFR BLD AUTO: 5.9 % (ref 0.3–6.2)
ERYTHROCYTE [DISTWIDTH] IN BLOOD BY AUTOMATED COUNT: 12.9 % (ref 12.3–15.4)
GAS FLOW AIRWAY: 2 LPM
GFR SERPL CREATININE-BSD FRML MDRD: 71 ML/MIN/1.73
GLOBULIN UR ELPH-MCNC: 2.9 GM/DL
GLUCOSE BLD-MCNC: 100 MG/DL (ref 65–99)
HCO3 BLDA-SCNC: 27.5 MMOL/L (ref 22–28)
HCT VFR BLD AUTO: 35.4 % (ref 34–46.6)
HCT VFR BLD CALC: 36.5 %
HGB BLD-MCNC: 11.8 G/DL (ref 12–15.9)
HGB BLDA-MCNC: 11.9 G/DL (ref 12–18)
HOROWITZ INDEX BLD+IHG-RTO: 28 %
IMM GRANULOCYTES # BLD AUTO: 0.02 10*3/MM3 (ref 0–0.05)
IMM GRANULOCYTES NFR BLD AUTO: 0.2 % (ref 0–0.5)
LYMPHOCYTES # BLD AUTO: 2.68 10*3/MM3 (ref 0.7–3.1)
LYMPHOCYTES NFR BLD AUTO: 31 % (ref 19.6–45.3)
MCH RBC QN AUTO: 31.4 PG (ref 26.6–33)
MCHC RBC AUTO-ENTMCNC: 33.3 G/DL (ref 31.5–35.7)
MCV RBC AUTO: 94.1 FL (ref 79–97)
METHGB BLD QL: 0.8 % (ref 0–1.5)
MODALITY: ABNORMAL
MONOCYTES # BLD AUTO: 0.75 10*3/MM3 (ref 0.1–0.9)
MONOCYTES NFR BLD AUTO: 8.7 % (ref 5–12)
NEUTROPHILS # BLD AUTO: 4.62 10*3/MM3 (ref 1.7–7)
NEUTROPHILS NFR BLD AUTO: 53.5 % (ref 42.7–76)
NOTE: ABNORMAL
NRBC BLD AUTO-RTO: 0 /100 WBC (ref 0–0.2)
NT-PROBNP SERPL-MCNC: 702.6 PG/ML (ref 5–1800)
OXYHGB MFR BLDV: 94.7 % (ref 94–99)
PCO2 BLDA: 42.9 MM HG (ref 35–45)
PCO2 TEMP ADJ BLD: ABNORMAL MM[HG]
PH BLDA: 7.42 PH UNITS (ref 7.3–7.5)
PH, TEMP CORRECTED: ABNORMAL
PLATELET # BLD AUTO: 321 10*3/MM3 (ref 140–450)
PMV BLD AUTO: 9.9 FL (ref 6–12)
PO2 BLDA: 80.8 MM HG (ref 75–100)
PO2 TEMP ADJ BLD: ABNORMAL MM[HG]
POTASSIUM BLD-SCNC: 4.1 MMOL/L (ref 3.5–5.2)
PROT SERPL-MCNC: 7.2 G/DL (ref 6–8.5)
PROT SERPL-MCNC: 7.6 G/DL (ref 6–8.5)
RBC # BLD AUTO: 3.76 10*6/MM3 (ref 3.77–5.28)
SAO2 % BLDCOA: 95.9 % (ref 94–100)
SODIUM BLD-SCNC: 138 MMOL/L (ref 136–145)
TROPONIN T SERPL-MCNC: <0.01 NG/ML (ref 0–0.03)
VENTILATOR MODE: ABNORMAL
WBC NRBC COR # BLD: 8.64 10*3/MM3 (ref 3.4–10.8)

## 2019-12-04 PROCEDURE — 82805 BLOOD GASES W/O2 SATURATION: CPT

## 2019-12-04 PROCEDURE — 83880 ASSAY OF NATRIURETIC PEPTIDE: CPT | Performed by: EMERGENCY MEDICINE

## 2019-12-04 PROCEDURE — 36600 WITHDRAWAL OF ARTERIAL BLOOD: CPT

## 2019-12-04 PROCEDURE — 85025 COMPLETE CBC W/AUTO DIFF WBC: CPT | Performed by: EMERGENCY MEDICINE

## 2019-12-04 PROCEDURE — 94799 UNLISTED PULMONARY SVC/PX: CPT

## 2019-12-04 PROCEDURE — 71046 X-RAY EXAM CHEST 2 VIEWS: CPT

## 2019-12-04 PROCEDURE — 96374 THER/PROPH/DIAG INJ IV PUSH: CPT

## 2019-12-04 PROCEDURE — 82375 ASSAY CARBOXYHB QUANT: CPT

## 2019-12-04 PROCEDURE — 99284 EMERGENCY DEPT VISIT MOD MDM: CPT

## 2019-12-04 PROCEDURE — 93005 ELECTROCARDIOGRAM TRACING: CPT | Performed by: EMERGENCY MEDICINE

## 2019-12-04 PROCEDURE — 84484 ASSAY OF TROPONIN QUANT: CPT | Performed by: EMERGENCY MEDICINE

## 2019-12-04 PROCEDURE — 25010000002 DEXAMETHASONE PER 1 MG: Performed by: EMERGENCY MEDICINE

## 2019-12-04 PROCEDURE — 80053 COMPREHEN METABOLIC PANEL: CPT | Performed by: EMERGENCY MEDICINE

## 2019-12-04 PROCEDURE — 83050 HGB METHEMOGLOBIN QUAN: CPT

## 2019-12-04 PROCEDURE — 94640 AIRWAY INHALATION TREATMENT: CPT

## 2019-12-04 RX ORDER — LEVOFLOXACIN 750 MG/1
750 TABLET ORAL DAILY
Qty: 5 TABLET | Refills: 0 | Status: SHIPPED | OUTPATIENT
Start: 2019-12-04 | End: 2019-12-09

## 2019-12-04 RX ORDER — LEVOFLOXACIN 750 MG/1
750 TABLET ORAL ONCE
Status: COMPLETED | OUTPATIENT
Start: 2019-12-04 | End: 2019-12-04

## 2019-12-04 RX ORDER — DEXAMETHASONE SODIUM PHOSPHATE 10 MG/ML
10 INJECTION INTRAMUSCULAR; INTRAVENOUS ONCE
Status: COMPLETED | OUTPATIENT
Start: 2019-12-04 | End: 2019-12-04

## 2019-12-04 RX ORDER — PREDNISONE 20 MG/1
60 TABLET ORAL DAILY
Qty: 15 TABLET | Refills: 0 | Status: SHIPPED | OUTPATIENT
Start: 2019-12-04 | End: 2019-12-09

## 2019-12-04 RX ORDER — IPRATROPIUM BROMIDE AND ALBUTEROL SULFATE 2.5; .5 MG/3ML; MG/3ML
3 SOLUTION RESPIRATORY (INHALATION) ONCE
Status: COMPLETED | OUTPATIENT
Start: 2019-12-04 | End: 2019-12-04

## 2019-12-04 RX ADMIN — LEVOFLOXACIN 750 MG: 750 TABLET, FILM COATED ORAL at 20:00

## 2019-12-04 RX ADMIN — IPRATROPIUM BROMIDE AND ALBUTEROL SULFATE 3 ML: .5; 3 SOLUTION RESPIRATORY (INHALATION) at 17:44

## 2019-12-04 RX ADMIN — DEXAMETHASONE SODIUM PHOSPHATE 10 MG: 10 INJECTION INTRAMUSCULAR; INTRAVENOUS at 18:03

## 2019-12-04 NOTE — ED PROVIDER NOTES
Subjective   74-year-old female presenting with shortness of breath.  She states that last week she has felt unwell.  She has had a cough that is occasionally productive, shortness of breath.  She is also had some difficulty breathing when she lays flat.  She has had to use her supplemental oxygen fairly consistently for the last couple days.  She denies any fevers, chills, nausea, vomiting, abdominal pain.  She notes a similar episode of this a year ago and was diagnosed with a heart attack and pneumonia after spending 2 weeks in the hospital.            Review of Systems   Constitutional: Negative.    HENT: Negative.    Eyes: Negative.    Respiratory: Positive for cough and shortness of breath.    Cardiovascular: Positive for leg swelling. Negative for chest pain.   Gastrointestinal: Negative.    Genitourinary: Negative.    Musculoskeletal: Negative.    Skin: Negative.    Neurological: Negative.    Psychiatric/Behavioral: Negative.        Past Medical History:   Diagnosis Date   • COPD (chronic obstructive pulmonary disease) (CMS/[facility])    • Hypertension        No Known Allergies    Past Surgical History:   Procedure Laterality Date   • CARDIAC CATHETERIZATION N/A 1/14/2019    Procedure: Left Heart Cath;  Surgeon: Suraj Quintero MD;  Location: [facility] CATH INVASIVE LOCATION;  Service: Cardiology   • CARPAL TUNNEL RELEASE     • HYSTERECTOMY     • SHOULDER SURGERY     • TRIGGER FINGER RELEASE         History reviewed. No pertinent family history.    Social History     Socioeconomic History   • Marital status: Unknown     Spouse name: Not on file   • Number of children: Not on file   • Years of education: Not on file   • Highest education level: Not on file   Tobacco Use   • Smoking status: Former Smoker     Years: 50.00     Types: Cigarettes   • Tobacco comment: quit 1 year   Substance and Sexual Activity   • Alcohol use: No     Frequency: Never   • Drug use: No   • Sexual activity: Defer           Objective    Physical Exam   Constitutional: She is oriented to person, place, and time. She appears well-developed and well-nourished. No distress.   HENT:   Head: Normocephalic and atraumatic.   Right Ear: External ear normal.   Left Ear: External ear normal.   Nose: Nose normal.   Mouth/Throat: Oropharynx is clear and moist.   Eyes: Conjunctivae and EOM are normal. Pupils are equal, round, and reactive to light.   Neck: Normal range of motion. Neck supple.   Cardiovascular: Normal rate, regular rhythm, normal heart sounds and intact distal pulses.   Pulmonary/Chest: Effort normal. No respiratory distress.   Very faint end expiratory wheeze scattered throughout   Abdominal: Soft. Bowel sounds are normal. She exhibits no distension. There is no tenderness. There is no rebound and no guarding.   Musculoskeletal: Normal range of motion. She exhibits no tenderness or deformity.   Pitting edema up to the knees bilaterally   Neurological: She is alert and oriented to person, place, and time.   Skin: Skin is warm and dry. No rash noted.   Psychiatric: She has a normal mood and affect. Her behavior is normal.   Nursing note and vitals reviewed.      Procedures           ED Course                  MDM  Number of Diagnoses or Management Options  COPD exacerbation (CMS/[facility]):   Diagnosis management comments: 74-year-old female with cough, shortness of breath, PND.  Well-developed, well-nourished elderly lady in no distress with exam as above.  Will check labs, EKG and chest x-ray.  Will treat symptomatically.  Disposition pending work-up.    DDX: COPD, pneumonia, CHF, ACS    EKG interpreted by me: Sinus rhythm, normal rate, left axis, some nonspecific T wave changes, no acute ST changes, poor R wave progression, this is an abnormal EKG    Lab work is largely unremarkable.  Chest x-ray, interpretation reveals no obvious acute abnormalities.  She is feeling better.  I do feel she is safe for discharge home.  We will treat as COPD  exacerbation.  Very strict return precautions discussed.  Patient and family are comfortable with and understanding of the plan.       Amount and/or Complexity of Data Reviewed  Clinical lab tests: reviewed  Tests in the radiology section of CPT®: reviewed        Final diagnoses:   COPD exacerbation (CMS/[facility])              Jean Guevara MD  12/04/19 1952

## 2020-09-28 ENCOUNTER — TRANSCRIBE ORDERS (OUTPATIENT)
Dept: ADMINISTRATIVE | Facility: HOSPITAL | Age: 75
End: 2020-09-28

## 2020-09-28 DIAGNOSIS — Z78.0 POSTMENOPAUSAL STATE: Primary | ICD-10-CM

## 2021-05-04 ENCOUNTER — TRANSCRIBE ORDERS (OUTPATIENT)
Dept: ADMINISTRATIVE | Facility: HOSPITAL | Age: 76
End: 2021-05-04

## 2021-05-04 DIAGNOSIS — Z78.0 POST-MENOPAUSAL: Primary | ICD-10-CM

## 2021-10-13 NOTE — PLAN OF CARE
Physical Therapy  Visit Type: re-evaluation  Co-treat with: Occupational therapist  Precautions:  Medical precautions:  seizure precautions and fall risk; standard precautions.  EVD (needs to be clamped prior to activity)  SBP<140  HOB >30*    Lines:     Basic: continuous pulse oximetry, telemetry and capped IV    Complex: E.V.D. drain (clamped)      Lines in chart and on patient reviewed, precautions maintained throughout session.  Vision:     Current vision: no visual deficits  Safety Measures: bed alarm and chair alarm    SUBJECTIVE  Patient agreed to participate in therapy this date.  Patient verbally agrees to allow the following to be present during session: spouse    Patient is a 68 year old male admitted to [facility] for cerebral hemorrhage . Pt with previous CVA noted as well as RA  Pt lives with spouse.  At baseline, patient is Independent with ADLs and Independent with functional mobility tasks using no assistive device. [facility]     10/13: OT re-evaluation completed s/p posterior fossa craniotomy for aneurysm ligation on 10/12.     Patient / Family Goal: return to previous functional status, maximize function and return home    Pain     Location: head    At onset of session (out of 10): 4  RN informed on pain level     OBJECTIVE     's-110's during entire session.    Level of consciousness: alert    Oriented to person, place, time and situation     Arousal alertness: appropriate responses to stimuli    Affect/Behavior: alert, pleasant, appropriate and cooperative  Patient activity tolerance: 1 to 1 activity to rest (dizziness and lightheaded)  Functional Communication/Cognition       Form of communication:  Verbal  Balance    Sitting: Static: independent double lower extremity support and back unsupported, Dynamic: supervision double lower extremity support and back unsupported    Standing - Firm Surface - Eyes Open: Static: contact  Problem: Patient Care Overview  Goal: Plan of Care Review  Outcome: Ongoing (interventions implemented as appropriate)   01/17/19 2091   Coping/Psychosocial   Plan of Care Reviewed With patient   OTHER   Outcome Summary awaiting bed at  for further cardiac interventions, BBP with peaked T waves on monitor           guard/touching/steadying assist  Dynamic: contact guard/touching/steadying assist double upper extremity support  Balance Training Completed    Training Tasks: static standing, feet together and dynamic standing    Using: eyes open, even surfaces and using their assistive device    Education Details: patient safety  Sensation - Lower Extremity  L1 (back, over trochanter and groin):     Light Touch: Left: intact Right: intact  L2 (back, front of thigh to knee):     Light Touch: Left: intact Right: intact  L3 (back, upper buttock, anterior thigh, knee, medial lower leg):     Light Touch: Left: intact Right: intact  L4 (medial buttock, lateral thigh, medial leg, dorsum of foot, great toe):     Light Touch: Left: intact Right: intact  L5 (buttock, posterior and lateral thigh, lateral aspect of leg, dorsum of foot, medial half of sole, 1-3rd toes):     Light Touch: Left: intact Right: intact   S1 (buttock, thigh, posterior leg):     Light Touch: Left: intact Right: intact    Bed Mobility:      Training completed:            Transfers:    Assistive devices: gait belt, 2-wheeled walker and none    Sit to stand: contact guard/touching/steadying assist    Stand to sit: contact guard/touching/steadying assist    Stand pivot: contact guard/touching/steadying assist  Training completed:      Education details: patient safety, body mechanics and patient requires additional training    Instruction for hand placement      Gait/Ambulation:     Assistance: contact guard/touching/steadying assist   Assistive device: gait belt, 2-wheeled walker and none    Distance (ft): 15; 15    Pattern: step through    Type: decreased kelly    Stance phase: Left: decreased heel strike and decreased push off; Right: decreased heel strike and decreased push off    Surface: even, tile and carpet  Training Completed:    Tasks: gait training on level surfaces    Education details: patient safety and body mechanics    Decreased gait speed, with  shortened step length and decreased arm swing without device          Interventions     Training provided: activity tolerance, functional ambulation, transfer training, gait training and safety training    Skilled input: Verbal instruction/cues and as detailed above  Verbal Consent: Writer verbally educated and received verbal consent for hand placement, positioning of patient, and techniques to be performed today from patient for therapist position for techniques as described above and how they are pertinent to the patient's plan of care.       ASSESSMENT      Visit # since seen by PT:  0    Patient was re-evaluated following posterior craniotomy.  He is able ot move with CGA and WW and min A with no device, with improved gait mechanics with WW.  His SBP remained 100's-110's throughout session. At this time the patient continues to demonstrate balance and gait deficits, decreased activity tolerance which is limiting the completion of all functional mobility.  Further skilled physical therapy is required to address these limitations in attempt to maximize the patient's independence.    PT's recommendation for discharge is home, OP PT due to the following reasons:  Impaired balance, fall risk, weakness         Discharge Recommendations  Recommendation for Discharge: PT WI: home, OP PT pending progress             PT/OT Mobility Equipment for Discharge: TBD   PT/OT ADL Equipment for Discharge: shower chair, grab bars      Skilled therapy is required to address these limitations in attempt to maximize the patient's independence.  Progress: progressing toward goals    End of Session:   Location: in chair  Safety measures: alarm system in place/re-engaged, lines intact and call light within reach  Handoff to: nurse  Education Provided:   Learning assessment:  - Primary learner: patient  - Are they ready to learn: yes  - Preferred learning style: verbal  - Barriers to learning: no barriers apparent at this time  Education  provided during session:  - Receiving education: patient  - Results of above outlined education: Verbalizes understanding and Needs reinforcement    PLAN    Suggestions for next session as indicated: Progress ambulation as able, monitor BP, static/dynamic balance    PT Frequency: Once a day         Interventions: balance, body mechanics, compensatory technique education, gait training, neuromuscular re-education, strengthening, bed mobility, equipment eval/education, patient/family training, safety education, functional transfer training, endurance training and stairs retraining  Agreement to plan and goals: patient agrees with goals and treatment plan        GOALS  Review Date: 10/20/2021  Short Term Goals:   Patient will complete DGI to determine fall risk  Long Term Goals: (to be met by time of discharge from hospital)  Sit to supine: Patient will complete sit to supine independent.  Status: progressing/ongoing  Supine to sit: Patient will complete supine to sit independent.  Status: progressing/ongoing  Sit to stand: Patient will complete sit to stand transfer with no device, independent.   Status: progressing/ongoing  Stand to sit: Patient will complete stand to sit transfer with no device, independent.   Status: progressing/ongoing  Stand pivot: Patient will complete stand pivot transfer with no device, independent.   Status: progressing/ongoing  Ambulation (even): Patient will ambulate on even surface for 150 feet with no device, independent.   Status: progressing/ongoing  3-4 steps: Patient will ambulate 3-4 steps with using one rail, modified independent.  Status: progressing/ongoing    Documented in the chart in the following areas: Pain. Assessment.      Therapy procedure time and total treatment time can be found documented on the Time Entry flowsheet

## 2021-10-26 ENCOUNTER — OFFICE VISIT (OUTPATIENT)
Dept: PULMONOLOGY | Facility: CLINIC | Age: 76
End: 2021-10-26

## 2021-10-26 ENCOUNTER — TRANSCRIBE ORDERS (OUTPATIENT)
Dept: LAB | Facility: HOSPITAL | Age: 76
End: 2021-10-26

## 2021-10-26 ENCOUNTER — LAB (OUTPATIENT)
Dept: LAB | Facility: HOSPITAL | Age: 76
End: 2021-10-26

## 2021-10-26 VITALS
WEIGHT: 176 LBS | HEIGHT: 66 IN | OXYGEN SATURATION: 91 % | HEART RATE: 75 BPM | RESPIRATION RATE: 18 BRPM | SYSTOLIC BLOOD PRESSURE: 110 MMHG | BODY MASS INDEX: 28.28 KG/M2 | DIASTOLIC BLOOD PRESSURE: 60 MMHG

## 2021-10-26 DIAGNOSIS — Z87.891 PERSONAL HISTORY OF TOBACCO USE, PRESENTING HAZARDS TO HEALTH: ICD-10-CM

## 2021-10-26 DIAGNOSIS — R73.03 PREDIABETES: ICD-10-CM

## 2021-10-26 DIAGNOSIS — E78.5 HYPERLIPIDEMIA, UNSPECIFIED HYPERLIPIDEMIA TYPE: ICD-10-CM

## 2021-10-26 DIAGNOSIS — G47.34 NOCTURNAL HYPOXIA: ICD-10-CM

## 2021-10-26 DIAGNOSIS — J44.9 CHRONIC OBSTRUCTIVE PULMONARY DISEASE, UNSPECIFIED COPD TYPE (HCC): ICD-10-CM

## 2021-10-26 DIAGNOSIS — R06.02 SHORTNESS OF BREATH: Primary | ICD-10-CM

## 2021-10-26 DIAGNOSIS — E78.5 HYPERLIPIDEMIA, UNSPECIFIED HYPERLIPIDEMIA TYPE: Primary | ICD-10-CM

## 2021-10-26 LAB
ALBUMIN SERPL-MCNC: 4.6 G/DL (ref 3.5–5.2)
ALBUMIN/GLOB SERPL: 1.7 G/DL
ALP SERPL-CCNC: 77 U/L (ref 39–117)
ALT SERPL W P-5'-P-CCNC: 20 U/L (ref 1–33)
ANION GAP SERPL CALCULATED.3IONS-SCNC: 9.4 MMOL/L (ref 5–15)
AST SERPL-CCNC: 23 U/L (ref 1–32)
BASOPHILS # BLD AUTO: 0.08 10*3/MM3 (ref 0–0.2)
BASOPHILS NFR BLD AUTO: 1 % (ref 0–1.5)
BILIRUB SERPL-MCNC: 0.6 MG/DL (ref 0–1.2)
BUN SERPL-MCNC: 11 MG/DL (ref 8–23)
BUN/CREAT SERPL: 10.8 (ref 7–25)
CALCIUM SPEC-SCNC: 10.1 MG/DL (ref 8.6–10.5)
CHLORIDE SERPL-SCNC: 93 MMOL/L (ref 98–107)
CHOLEST SERPL-MCNC: 99 MG/DL (ref 0–200)
CO2 SERPL-SCNC: 27.6 MMOL/L (ref 22–29)
CREAT SERPL-MCNC: 1.02 MG/DL (ref 0.57–1)
DEPRECATED RDW RBC AUTO: 42.4 FL (ref 37–54)
EOSINOPHIL # BLD AUTO: 0.24 10*3/MM3 (ref 0–0.4)
EOSINOPHIL NFR BLD AUTO: 3 % (ref 0.3–6.2)
ERYTHROCYTE [DISTWIDTH] IN BLOOD BY AUTOMATED COUNT: 12.6 % (ref 12.3–15.4)
GFR SERPL CREATININE-BSD FRML MDRD: 53 ML/MIN/1.73
GLOBULIN UR ELPH-MCNC: 2.7 GM/DL
GLUCOSE SERPL-MCNC: 86 MG/DL (ref 65–99)
HBA1C MFR BLD: 5.93 % (ref 4.8–5.6)
HCT VFR BLD AUTO: 36.9 % (ref 34–46.6)
HDLC SERPL-MCNC: 52 MG/DL (ref 40–60)
HGB BLD-MCNC: 12.3 G/DL (ref 12–15.9)
IMM GRANULOCYTES # BLD AUTO: 0.05 10*3/MM3 (ref 0–0.05)
IMM GRANULOCYTES NFR BLD AUTO: 0.6 % (ref 0–0.5)
LDLC SERPL CALC-MCNC: 29 MG/DL (ref 0–100)
LDLC/HDLC SERPL: 0.55 {RATIO}
LYMPHOCYTES # BLD AUTO: 2.63 10*3/MM3 (ref 0.7–3.1)
LYMPHOCYTES NFR BLD AUTO: 33.3 % (ref 19.6–45.3)
MCH RBC QN AUTO: 30.8 PG (ref 26.6–33)
MCHC RBC AUTO-ENTMCNC: 33.3 G/DL (ref 31.5–35.7)
MCV RBC AUTO: 92.5 FL (ref 79–97)
MONOCYTES # BLD AUTO: 0.71 10*3/MM3 (ref 0.1–0.9)
MONOCYTES NFR BLD AUTO: 9 % (ref 5–12)
NEUTROPHILS NFR BLD AUTO: 4.18 10*3/MM3 (ref 1.7–7)
NEUTROPHILS NFR BLD AUTO: 53.1 % (ref 42.7–76)
NRBC BLD AUTO-RTO: 0.1 /100 WBC (ref 0–0.2)
PLATELET # BLD AUTO: 354 10*3/MM3 (ref 140–450)
PMV BLD AUTO: 10.7 FL (ref 6–12)
POTASSIUM SERPL-SCNC: 4.7 MMOL/L (ref 3.5–5.2)
PROT SERPL-MCNC: 7.3 G/DL (ref 6–8.5)
RBC # BLD AUTO: 3.99 10*6/MM3 (ref 3.77–5.28)
SODIUM SERPL-SCNC: 130 MMOL/L (ref 136–145)
TRIGL SERPL-MCNC: 93 MG/DL (ref 0–150)
VLDLC SERPL-MCNC: 18 MG/DL (ref 5–40)
WBC # BLD AUTO: 7.89 10*3/MM3 (ref 3.4–10.8)

## 2021-10-26 PROCEDURE — 94618 PULMONARY STRESS TESTING: CPT | Performed by: INTERNAL MEDICINE

## 2021-10-26 PROCEDURE — 83036 HEMOGLOBIN GLYCOSYLATED A1C: CPT

## 2021-10-26 PROCEDURE — 36415 COLL VENOUS BLD VENIPUNCTURE: CPT

## 2021-10-26 PROCEDURE — 85025 COMPLETE CBC W/AUTO DIFF WBC: CPT

## 2021-10-26 PROCEDURE — 80061 LIPID PANEL: CPT

## 2021-10-26 PROCEDURE — 99204 OFFICE O/P NEW MOD 45 MIN: CPT | Performed by: INTERNAL MEDICINE

## 2021-10-26 PROCEDURE — 80053 COMPREHEN METABOLIC PANEL: CPT

## 2021-10-26 RX ORDER — ALBUTEROL SULFATE 90 UG/1
AEROSOL, METERED RESPIRATORY (INHALATION)
COMMUNITY

## 2021-10-26 RX ORDER — MULTIPLE VITAMINS W/ MINERALS TAB 9MG-400MCG
TAB ORAL
COMMUNITY

## 2021-10-26 NOTE — PROGRESS NOTES
CONSULT NOTE    Requested by:   Maria De Jesus Morrell MD Naqvi, Nuzhat, MD      Chief Complaint   Patient presents with   • Consult   • Breathing Problem       Subjective:  Ijeoma Palacios is a 76 y.o. female.     History of Present Illness   Patient comes in today for evaluation of shortness of breath. Patient says that she was diagnosed with COPD by another pulmonologist a few years ago.    The patient says that before the diagnosis she was having shortness of breath for the past few years and was noticing that her exercise tolerance was declining. The patient has had days when walking any significant distance is difficult to do without stopping in the middle, to catch her breath.  Her exercise capacity is limited however, due to back pain and knee pain.    Patient also says that she brings up phlegm in the morning along with cough. Patient used to smoke 1 pack per day for the past 50-55 years and is currently smoking 1-2 cigarettes a week.     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     she is currently not on oxygen.       The following portions of the patient's history were reviewed and updated as appropriate: allergies, current medications, past family history, past medical history, past social history and past surgical history.    Review of Systems   Constitutional: Negative for chills, fatigue and fever.   HENT: Positive for rhinorrhea, sinus pressure, sinus pain and sneezing. Negative for sore throat.    Respiratory: Positive for cough and shortness of breath (upon exertion). Negative for chest tightness and wheezing.    Cardiovascular: Negative for palpitations and leg swelling.   Psychiatric/Behavioral: Negative for sleep disturbance.   All other systems reviewed and are negative.      Past Medical History:   Diagnosis Date   • Basal cell carcinoma    • COPD (chronic obstructive pulmonary disease) (HCC)    • Emphysema of lung (HCC)    • Hypertension    •  "Sleep apnea, obstructive        Social History     Tobacco Use   • Smoking status: Former Smoker     Years: 50.00     Types: Cigarettes   • Smokeless tobacco: Not on file   • Tobacco comment: quit 1 year   Substance Use Topics   • Alcohol use: No         Objective:  Visit Vitals  /60   Pulse 75   Resp 18   Ht 167.6 cm (66\")   Wt 79.8 kg (176 lb)   SpO2 91%   BMI 28.41 kg/m²       Physical Exam  Vitals reviewed.   Constitutional:       Appearance: She is well-developed.   Neck:      Vascular: No JVD.   Cardiovascular:      Rate and Rhythm: Normal rate and regular rhythm.   Pulmonary:      Effort: Pulmonary effort is normal.      Breath sounds: Wheezing present. No rales.      Comments: Somewhat hyperresonant to percussion.  Somewhat decreased air entry.  Mild scattered wheezing noted.   Musculoskeletal:      Cervical back: Neck supple.      Comments: Gait was slow.    Skin:     General: Skin is warm and dry.   Neurological:      Mental Status: She is alert and oriented to person, place, and time.   Psychiatric:         Behavior: Behavior normal.         Assessment/Plan:  Diagnoses and all orders for this visit:    1. Shortness of breath (Primary)  -     Pulmonary Function Test; Future    2. Chronic obstructive pulmonary disease, unspecified COPD type (HCC)  -     Pulmonary Function Test; Future    3. Personal history of tobacco use, presenting hazards to health  -      CT Chest Low Dose Cancer Screening WO; Future    4. Nocturnal hypoxia    Other orders  -     Anoro Ellipta 62.5-25 MCG/INH aerosol powder  inhaler; Inhale 1 puff Daily.  Dispense: 60 each; Refill: 5        Return in about 3 months (around 1/26/2022) for Recheck, PFT F/U, Imaging, For [facility] ([place]), ....Also 8 mths w/ Dr. Cheek.    DISCUSSION(if any):  Last CT scan was reviewed in great detail with the patient. Images reviewed personally.   Results for orders placed during the hospital encounter of 06/27/18    CT Chest With " Contrast    Narrative  PROCEDURE: CT CHEST W CONTRAST-    HISTORY: F17.200; F17.200-Nicotine dependence, unspecified,  uncomplicated    COMPARISON: October 30, 2013.    PROCEDURE: Multiple axial CT images were obtained from the thoracic  inlet through the upper abdomen following the administration of  intravenous contrast.    FINDINGS:  Soft tissue windows demonstrate no adenopathy within the chest. The  heart is normal in size. The aorta is normal in caliber.There is no  pericardial or pleural effusion. There is a small hiatal hernia. Lung  windows reveal emphysematous changes. Scarring is present within both  lung apices. There is an approximately 4 mm nodule in the right upper  lobe seen on image 24 which is unchanged. There is a 5 mm nodule in the  right lower lobe on image 47 which is unchanged.  The visualized upper  abdomen is unremarkable. Bone windows reveal no acute osseous  abnormalities.    Impression  Noncalcified pulmonary nodules which are unchanged compared  to the prior exam. If the patient has a greater than 30 pack-year  smoking history the patient may be eligible for annual low-dose  screening chest CT.      This study was performed with techniques to keep radiation doses as low  as reasonably achievable (ALARA). Individualized dose reduction  techniques using automated exposure control or adjustment of mA and/or  kV according to the patient size were employed.    This report was finalized on 6/27/2018 2:09 PM by Natty Duggan M.D..    I have also reviewed her last emergency room note, from December 2019, that mentions COPD exacerbation.     I also reviewed her last echocardiogram and shared the results with her.   Results for orders placed during the hospital encounter of 01/13/19    Adult Transthoracic Echo Complete W/ Cont if Necessary Per Protocol    Interpretation Summary  Technically difficult study  1) Borderline LVH with severe reduction in LV systolic function ( EF 30 to 35%)  2)  Normal left atrial size with mild elevation in LVedp  3) Thickened mitral leaflets with mild MR  4) Aortic sclerosis with moderate AI  5) Mild TR with PAsp of 40 mm of hg  6) Small RV with normal function  7) Akinetic mid and apical segments of the LV      ===========================  ===========================    PFTs were ordered for follow up visit.     Laboratory workup was also reviewed which showed   Lab Results   Component Value Date    HGB 11.8 (L) 12/04/2019    HGB 12.5 06/17/2019    HGB 14.8 01/17/2019   ,    Lab Results   Component Value Date    EOSABS 0.51 (H) 12/04/2019    EOSABS 0.03 01/17/2019    EOSABS 0.01 01/15/2019    & Laboratory workup also showed   Lab Results   Component Value Date    CO2 27.1 12/04/2019     Lab Results   Component Value Date    PHART 7.416 12/04/2019    UCR8HSY 42.9 12/04/2019    PO2ART 80.8 12/04/2019    HGBBG 11.9 (L) 12/04/2019    J9AHNSTE 95.9 12/04/2019    CARBOXYHGB <0.6 12/04/2019     ===========================  ===========================    6 minute walk test performed today.  Total distance walked: 234 meters    Oxygen saturation dropped to 89 % during the walk, on room air.     I have asked my office staff to obtain records from previous pulmonologist so they can be reviewed to ascertain the stage of COPD and to review last PFTs/imaging studies etc.    Orders as above.    I told the patient that based on history and physical exam, she does appear to have COPD, as the most likely cause for her symptoms.     Patient was educated on compliance with, and the correct method of using the pulmonary medicines.     Side effects, of prescribed medicines, discussed.    I have told her that we will made further recommendations, based on clinical response.     The patient belongs to the risk group for which lung cancer screening has been recommended. We will try to make arrangements for the same and this will be indicated soon. This has been ordered.    I have encouraged the  patient to call or schedule a visit earlier, if there are any concerns.        Dictated utilizing Dragon dictation.    This document was electronically signed by Cathy Cheek MD on 10/26/21 at 10:59 EDT

## 2021-11-16 ENCOUNTER — HOSPITAL ENCOUNTER (OUTPATIENT)
Dept: CT IMAGING | Facility: HOSPITAL | Age: 76
Discharge: HOME | End: 2021-11-16
Admitting: INTERNAL MEDICINE

## 2021-11-16 DIAGNOSIS — Z87.891 PERSONAL HISTORY OF TOBACCO USE, PRESENTING HAZARDS TO HEALTH: ICD-10-CM

## 2021-11-16 PROCEDURE — 71271 CT THORAX LUNG CANCER SCR C-: CPT

## 2022-05-09 ENCOUNTER — TRANSCRIBE ORDERS (OUTPATIENT)
Dept: LAB | Facility: HOSPITAL | Age: 77
End: 2022-05-09

## 2022-05-09 DIAGNOSIS — E78.5 HYPERLIPIDEMIA, UNSPECIFIED HYPERLIPIDEMIA TYPE: Primary | ICD-10-CM

## 2022-07-19 ENCOUNTER — LAB (OUTPATIENT)
Dept: LAB | Facility: HOSPITAL | Age: 77
End: 2022-07-19

## 2022-07-19 ENCOUNTER — TRANSCRIBE ORDERS (OUTPATIENT)
Dept: LAB | Facility: HOSPITAL | Age: 77
End: 2022-07-19

## 2022-07-19 DIAGNOSIS — R73.09 IMPAIRED GLUCOSE TOLERANCE TEST: ICD-10-CM

## 2022-07-19 DIAGNOSIS — I25.10 DISEASE OF CARDIOVASCULAR SYSTEM: ICD-10-CM

## 2022-07-19 DIAGNOSIS — I10 ESSENTIAL HYPERTENSION, MALIGNANT: ICD-10-CM

## 2022-07-19 DIAGNOSIS — E78.00 PURE HYPERCHOLESTEROLEMIA: Primary | ICD-10-CM

## 2022-07-19 DIAGNOSIS — E78.00 PURE HYPERCHOLESTEROLEMIA: ICD-10-CM

## 2022-07-19 DIAGNOSIS — E55.9 AVITAMINOSIS D: ICD-10-CM

## 2022-07-19 LAB
ALBUMIN SERPL-MCNC: 4.5 G/DL (ref 3.5–5.2)
ALBUMIN UR-MCNC: <1.2 MG/DL
ALP SERPL-CCNC: 69 U/L (ref 39–117)
ALT SERPL W P-5'-P-CCNC: 20 U/L (ref 1–33)
ANION GAP SERPL CALCULATED.3IONS-SCNC: 12 MMOL/L (ref 5–15)
AST SERPL-CCNC: 27 U/L (ref 1–32)
BASOPHILS # BLD AUTO: 0.05 10*3/MM3 (ref 0–0.2)
BASOPHILS NFR BLD AUTO: 0.8 % (ref 0–1.5)
BILIRUB CONJ SERPL-MCNC: <0.2 MG/DL (ref 0–0.3)
BILIRUB INDIRECT SERPL-MCNC: NORMAL MG/DL
BILIRUB SERPL-MCNC: 0.8 MG/DL (ref 0–1.2)
BUN SERPL-MCNC: 13 MG/DL (ref 8–23)
BUN/CREAT SERPL: 11.2 (ref 7–25)
CALCIUM SPEC-SCNC: 10.2 MG/DL (ref 8.6–10.5)
CHLORIDE SERPL-SCNC: 99 MMOL/L (ref 98–107)
CHOLEST SERPL-MCNC: 101 MG/DL (ref 0–200)
CO2 SERPL-SCNC: 24 MMOL/L (ref 22–29)
CREAT SERPL-MCNC: 1.16 MG/DL (ref 0.57–1)
CREAT UR-MCNC: 48.1 MG/DL
DEPRECATED RDW RBC AUTO: 42.6 FL (ref 37–54)
EGFRCR SERPLBLD CKD-EPI 2021: 48.7 ML/MIN/1.73
EOSINOPHIL # BLD AUTO: 0.21 10*3/MM3 (ref 0–0.4)
EOSINOPHIL NFR BLD AUTO: 3.2 % (ref 0.3–6.2)
ERYTHROCYTE [DISTWIDTH] IN BLOOD BY AUTOMATED COUNT: 12.8 % (ref 12.3–15.4)
GLUCOSE SERPL-MCNC: 92 MG/DL (ref 65–99)
HBA1C MFR BLD: 6.3 % (ref 4.8–5.6)
HCT VFR BLD AUTO: 36.4 % (ref 34–46.6)
HDLC SERPL-MCNC: 51 MG/DL (ref 40–60)
HGB BLD-MCNC: 12.3 G/DL (ref 12–15.9)
IMM GRANULOCYTES # BLD AUTO: 0.03 10*3/MM3 (ref 0–0.05)
IMM GRANULOCYTES NFR BLD AUTO: 0.5 % (ref 0–0.5)
LDLC SERPL CALC-MCNC: 33 MG/DL (ref 0–100)
LDLC/HDLC SERPL: 0.65 {RATIO}
LYMPHOCYTES # BLD AUTO: 2.13 10*3/MM3 (ref 0.7–3.1)
LYMPHOCYTES NFR BLD AUTO: 32.6 % (ref 19.6–45.3)
MAGNESIUM SERPL-MCNC: 2 MG/DL (ref 1.6–2.4)
MCH RBC QN AUTO: 30.8 PG (ref 26.6–33)
MCHC RBC AUTO-ENTMCNC: 33.8 G/DL (ref 31.5–35.7)
MCV RBC AUTO: 91.2 FL (ref 79–97)
MICROALBUMIN/CREAT UR: NORMAL MG/G{CREAT}
MONOCYTES # BLD AUTO: 0.58 10*3/MM3 (ref 0.1–0.9)
MONOCYTES NFR BLD AUTO: 8.9 % (ref 5–12)
NEUTROPHILS NFR BLD AUTO: 3.53 10*3/MM3 (ref 1.7–7)
NEUTROPHILS NFR BLD AUTO: 54 % (ref 42.7–76)
NRBC BLD AUTO-RTO: 0 /100 WBC (ref 0–0.2)
PLATELET # BLD AUTO: 309 10*3/MM3 (ref 140–450)
PMV BLD AUTO: 11.2 FL (ref 6–12)
POTASSIUM SERPL-SCNC: 4.3 MMOL/L (ref 3.5–5.2)
PROT SERPL-MCNC: 7 G/DL (ref 6–8.5)
RBC # BLD AUTO: 3.99 10*6/MM3 (ref 3.77–5.28)
SODIUM SERPL-SCNC: 135 MMOL/L (ref 136–145)
TRIGL SERPL-MCNC: 83 MG/DL (ref 0–150)
TSH SERPL DL<=0.05 MIU/L-ACNC: 5.82 UIU/ML (ref 0.27–4.2)
VLDLC SERPL-MCNC: 17 MG/DL (ref 5–40)
WBC NRBC COR # BLD: 6.53 10*3/MM3 (ref 3.4–10.8)

## 2022-07-19 PROCEDURE — 83036 HEMOGLOBIN GLYCOSYLATED A1C: CPT

## 2022-07-19 PROCEDURE — 82306 VITAMIN D 25 HYDROXY: CPT

## 2022-07-19 PROCEDURE — 85025 COMPLETE CBC W/AUTO DIFF WBC: CPT

## 2022-07-19 PROCEDURE — 84443 ASSAY THYROID STIM HORMONE: CPT

## 2022-07-19 PROCEDURE — 82607 VITAMIN B-12: CPT

## 2022-07-19 PROCEDURE — 83735 ASSAY OF MAGNESIUM: CPT

## 2022-07-19 PROCEDURE — 80048 BASIC METABOLIC PNL TOTAL CA: CPT

## 2022-07-19 PROCEDURE — 36415 COLL VENOUS BLD VENIPUNCTURE: CPT

## 2022-07-19 PROCEDURE — 82570 ASSAY OF URINE CREATININE: CPT

## 2022-07-19 PROCEDURE — 82043 UR ALBUMIN QUANTITATIVE: CPT

## 2022-07-19 PROCEDURE — 80076 HEPATIC FUNCTION PANEL: CPT

## 2022-07-19 PROCEDURE — 80061 LIPID PANEL: CPT

## 2022-07-20 LAB
25(OH)D3 SERPL-MCNC: 39.6 NG/ML (ref 30–100)
VIT B12 BLD-MCNC: 669 PG/ML (ref 211–946)

## 2022-08-11 ENCOUNTER — OFFICE VISIT (OUTPATIENT)
Dept: PULMONOLOGY | Facility: CLINIC | Age: 77
End: 2022-08-11

## 2022-08-11 VITALS
TEMPERATURE: 97.3 F | RESPIRATION RATE: 12 BRPM | HEIGHT: 66 IN | BODY MASS INDEX: 29.09 KG/M2 | DIASTOLIC BLOOD PRESSURE: 78 MMHG | WEIGHT: 181 LBS | OXYGEN SATURATION: 95 % | HEART RATE: 76 BPM | SYSTOLIC BLOOD PRESSURE: 126 MMHG

## 2022-08-11 DIAGNOSIS — R91.1 LUNG NODULE: ICD-10-CM

## 2022-08-11 DIAGNOSIS — Z87.891 PERSONAL HISTORY OF TOBACCO USE, PRESENTING HAZARDS TO HEALTH: ICD-10-CM

## 2022-08-11 DIAGNOSIS — R06.02 SOB (SHORTNESS OF BREATH): ICD-10-CM

## 2022-08-11 DIAGNOSIS — J44.9 CHRONIC OBSTRUCTIVE PULMONARY DISEASE, UNSPECIFIED COPD TYPE: Primary | ICD-10-CM

## 2022-08-11 DIAGNOSIS — R06.02 SHORTNESS OF BREATH: Primary | ICD-10-CM

## 2022-08-11 PROCEDURE — 99214 OFFICE O/P EST MOD 30 MIN: CPT | Performed by: NURSE PRACTITIONER

## 2022-08-11 PROCEDURE — 94060 EVALUATION OF WHEEZING: CPT | Performed by: INTERNAL MEDICINE

## 2022-08-11 PROCEDURE — 94729 DIFFUSING CAPACITY: CPT | Performed by: INTERNAL MEDICINE

## 2022-08-11 PROCEDURE — 94726 PLETHYSMOGRAPHY LUNG VOLUMES: CPT | Performed by: INTERNAL MEDICINE

## 2022-08-11 NOTE — PROGRESS NOTES
"Chief Complaint   Patient presents with   • Breathing Problem     followup         Subjective   Ijeoma Palacios is a 76 y.o. female.     History of Present Illness   Patient comes today for follow up of shortness of breath and COPD.     Symptoms have been stable since the last clinic visit. Patient reports no recent exacerbations.     Patient is using medications, as prescribed. She uses Anoro daily. She uses the rescue inhaler with hot weather and cold weather but not on a daily basis.     She uses the nebulizer if her sinus/allergies are bothering her or if she has a cold.     She takes Singulair nightly. She uses Flonase on occasion.     Exercise tolerance has also remained stable.     She uses oxygen at night.     If it is really hot or really cold she may use it with activity.     Quit smoking 3 years ago.    The following portions of the patient's history were reviewed and updated as appropriate: allergies, current medications, past family history, past medical history, past social history and past surgical history.    Review of Systems   Constitutional: Negative for chills.   HENT: Positive for sinus pressure.    Respiratory: Negative for shortness of breath.    Psychiatric/Behavioral: Positive for sleep disturbance.       Objective   Visit Vitals  /78   Pulse 76   Temp 97.3 °F (36.3 °C)   Resp 12   Ht 167.6 cm (66\")   Wt 82.1 kg (181 lb)   SpO2 95%   BMI 29.21 kg/m²         Physical Exam  Vitals reviewed.   HENT:      Head: Atraumatic.      Mouth/Throat:      Mouth: Mucous membranes are moist.      Comments: Dentures noted.  Eyes:      Extraocular Movements: Extraocular movements intact.   Cardiovascular:      Rate and Rhythm: Normal rate and regular rhythm.   Pulmonary:      Effort: Pulmonary effort is normal. No respiratory distress.      Comments: Somewhat decreased A/E without wheezing.   Musculoskeletal:      Cervical back: Neck supple.      Comments: Gait normal.   Skin:     General: Skin is warm. "   Neurological:      Mental Status: She is alert and oriented to person, place, and time.           Assessment & Plan   Diagnoses and all orders for this visit:    1. Chronic obstructive pulmonary disease, unspecified COPD type (HCC) (Primary)    2. Personal history of tobacco use, presenting hazards to health    3. SOB (shortness of breath)  -     Pulmonary Function Test    4. Lung nodule  -     CT Chest Without Contrast Diagnostic; Future           Return in about 6 months (around 2/11/2023) for Recheck, For Dr. Cheek.    DISCUSSION (if any):  I reviewed PFT results and discussed them with the patient.  PFT is consistent with severe obstruction.  There is no restriction or air trapping suggested.  There is a decreased diffusion capacity.    No change to the current medications has been made.  She should continue using Anoro and Singulair as directed.    She should continue using rescue medication as needed for shortness of breath and wheezing.  If she begins needing the rescue medication more often I have asked her to let us know.    She uses oxygen at night and sometimes with activity.  The oxygen was not ordered by our clinic.  On her last 6-minute walk in October 2021, she did not display exercise hypoxemia.    Compliance with medications stressed.     Side effects of prescribed medications discussed with the patient.    I reviewed CT from 11/2021 which shows 7 mm nodule. A f/u CT has been ordered to ensure stability. I have discussed the results with the patient.     Study Result    Narrative & Impression   PROCEDURE: CT CHEST LOW DOSE CANCER SCREENING WO-     HISTORY: Lung cancer screening, >= 30 pk-yr smoking history in last 15  yrs (Age 55-80y); Z87.891-Personal history of nicotine dependence     TECHNIQUE: Axial images were obtained from the thoracic inlet through  the upper abdomen per the low-dose protocol. Coronal images were  reformatted. DLP 63.44 mGy.cm ; CTDI 1.67 mGy.     COMPARISON: None.      FINDINGS: There is centrilobular emphysema. There is a 7 mm noncalcified  nodule in the right lower lobe on image 122 at the level of the  diaphragm. No other noncalcified pulmonary nodules are identified. There  is no airspace disease. The heart is normal in size. The aorta is normal  in caliber. There are no pleural or pericardial effusions. The  visualized upper abdomen is unremarkable. Bone windows reveal no acute  osseous abnormalities.     IMPRESSION:  Lung RADS 3 probably benign findings     RECOMMENDATIONS:Noncontrast chest CT in six months is recommended to  ensure stability of the 7 mm noncalcified nodule in the right lower  lobe.     This report was finalized on 11/16/2021 12:58 PM by Natty Duggan M.D..           Dictated utilizing Dragon dictation.    This document was electronically signed by HIMANSHU Oliva August 11, 2022  12:40 EDT

## 2022-09-09 ENCOUNTER — HOSPITAL ENCOUNTER (OUTPATIENT)
Dept: CT IMAGING | Facility: HOSPITAL | Age: 77
Discharge: HOME | End: 2022-09-09
Admitting: NURSE PRACTITIONER

## 2022-09-09 DIAGNOSIS — R91.1 LUNG NODULE: ICD-10-CM

## 2022-09-09 PROCEDURE — 71250 CT THORAX DX C-: CPT

## 2022-11-07 ENCOUNTER — TRANSCRIBE ORDERS (OUTPATIENT)
Dept: LAB | Facility: HOSPITAL | Age: 77
End: 2022-11-07

## 2022-11-07 ENCOUNTER — LAB (OUTPATIENT)
Dept: LAB | Facility: HOSPITAL | Age: 77
End: 2022-11-07

## 2022-11-07 DIAGNOSIS — E78.5 HYPERLIPIDEMIA, UNSPECIFIED HYPERLIPIDEMIA TYPE: Primary | ICD-10-CM

## 2022-11-07 DIAGNOSIS — E78.5 HYPERLIPIDEMIA, UNSPECIFIED HYPERLIPIDEMIA TYPE: ICD-10-CM

## 2022-11-07 PROCEDURE — 80061 LIPID PANEL: CPT

## 2022-11-07 PROCEDURE — 80053 COMPREHEN METABOLIC PANEL: CPT

## 2022-11-07 PROCEDURE — 85025 COMPLETE CBC W/AUTO DIFF WBC: CPT

## 2022-11-07 PROCEDURE — 36415 COLL VENOUS BLD VENIPUNCTURE: CPT

## 2022-11-08 LAB
ALBUMIN SERPL-MCNC: 4 G/DL (ref 3.5–5.2)
ALBUMIN/GLOB SERPL: 1.4 G/DL
ALP SERPL-CCNC: 74 U/L (ref 39–117)
ALT SERPL W P-5'-P-CCNC: 21 U/L (ref 1–33)
ANION GAP SERPL CALCULATED.3IONS-SCNC: 9.6 MMOL/L (ref 5–15)
AST SERPL-CCNC: 21 U/L (ref 1–32)
BASOPHILS # BLD AUTO: 0.06 10*3/MM3 (ref 0–0.2)
BASOPHILS NFR BLD AUTO: 0.9 % (ref 0–1.5)
BILIRUB SERPL-MCNC: 0.6 MG/DL (ref 0–1.2)
BUN SERPL-MCNC: 11 MG/DL (ref 8–23)
BUN/CREAT SERPL: 11.2 (ref 7–25)
CALCIUM SPEC-SCNC: 9.9 MG/DL (ref 8.6–10.5)
CHLORIDE SERPL-SCNC: 102 MMOL/L (ref 98–107)
CHOLEST SERPL-MCNC: 94 MG/DL (ref 0–200)
CO2 SERPL-SCNC: 25.4 MMOL/L (ref 22–29)
CREAT SERPL-MCNC: 0.98 MG/DL (ref 0.57–1)
DEPRECATED RDW RBC AUTO: 40 FL (ref 37–54)
EGFRCR SERPLBLD CKD-EPI 2021: 59.2 ML/MIN/1.73
EOSINOPHIL # BLD AUTO: 0.18 10*3/MM3 (ref 0–0.4)
EOSINOPHIL NFR BLD AUTO: 2.8 % (ref 0.3–6.2)
ERYTHROCYTE [DISTWIDTH] IN BLOOD BY AUTOMATED COUNT: 12.4 % (ref 12.3–15.4)
GLOBULIN UR ELPH-MCNC: 2.8 GM/DL
GLUCOSE SERPL-MCNC: 124 MG/DL (ref 65–99)
HCT VFR BLD AUTO: 34.4 % (ref 34–46.6)
HDLC SERPL-MCNC: 52 MG/DL (ref 40–60)
HGB BLD-MCNC: 11.9 G/DL (ref 12–15.9)
IMM GRANULOCYTES # BLD AUTO: 0.01 10*3/MM3 (ref 0–0.05)
IMM GRANULOCYTES NFR BLD AUTO: 0.2 % (ref 0–0.5)
LDLC SERPL CALC-MCNC: 23 MG/DL (ref 0–100)
LDLC/HDLC SERPL: 0.42 {RATIO}
LYMPHOCYTES # BLD AUTO: 2.16 10*3/MM3 (ref 0.7–3.1)
LYMPHOCYTES NFR BLD AUTO: 34.1 % (ref 19.6–45.3)
MCH RBC QN AUTO: 30.6 PG (ref 26.6–33)
MCHC RBC AUTO-ENTMCNC: 34.6 G/DL (ref 31.5–35.7)
MCV RBC AUTO: 88.4 FL (ref 79–97)
MONOCYTES # BLD AUTO: 0.49 10*3/MM3 (ref 0.1–0.9)
MONOCYTES NFR BLD AUTO: 7.7 % (ref 5–12)
NEUTROPHILS NFR BLD AUTO: 3.43 10*3/MM3 (ref 1.7–7)
NEUTROPHILS NFR BLD AUTO: 54.3 % (ref 42.7–76)
NRBC BLD AUTO-RTO: 0 /100 WBC (ref 0–0.2)
PLATELET # BLD AUTO: 293 10*3/MM3 (ref 140–450)
PMV BLD AUTO: 10.9 FL (ref 6–12)
POTASSIUM SERPL-SCNC: 3.8 MMOL/L (ref 3.5–5.2)
PROT SERPL-MCNC: 6.8 G/DL (ref 6–8.5)
RBC # BLD AUTO: 3.89 10*6/MM3 (ref 3.77–5.28)
SODIUM SERPL-SCNC: 137 MMOL/L (ref 136–145)
TRIGL SERPL-MCNC: 100 MG/DL (ref 0–150)
VLDLC SERPL-MCNC: 19 MG/DL (ref 5–40)
WBC NRBC COR # BLD: 6.33 10*3/MM3 (ref 3.4–10.8)

## 2022-11-15 ENCOUNTER — TRANSCRIBE ORDERS (OUTPATIENT)
Dept: ADMINISTRATIVE | Facility: HOSPITAL | Age: 77
End: 2022-11-15

## 2022-11-15 DIAGNOSIS — N95.9 MENOPAUSAL AND POSTMENOPAUSAL DISORDER: ICD-10-CM

## 2022-11-15 DIAGNOSIS — N95.9 UNSPECIFIED MENOPAUSAL AND PERIMENOPAUSAL DISORDER: Primary | ICD-10-CM

## 2022-11-22 ENCOUNTER — APPOINTMENT (OUTPATIENT)
Dept: BONE DENSITY | Facility: HOSPITAL | Age: 77
End: 2022-11-22

## 2022-11-22 DIAGNOSIS — N95.9 MENOPAUSAL AND POSTMENOPAUSAL DISORDER: ICD-10-CM

## 2022-11-22 DIAGNOSIS — N95.9 UNSPECIFIED MENOPAUSAL AND PERIMENOPAUSAL DISORDER: ICD-10-CM

## 2022-11-22 PROCEDURE — 77080 DXA BONE DENSITY AXIAL: CPT

## 2023-03-06 RX ORDER — UMECLIDINIUM BROMIDE AND VILANTEROL TRIFENATATE 62.5; 25 UG/1; UG/1
POWDER RESPIRATORY (INHALATION)
Qty: 60 EACH | Refills: 5 | OUTPATIENT
Start: 2023-03-06

## 2023-05-09 ENCOUNTER — TRANSCRIBE ORDERS (OUTPATIENT)
Dept: LAB | Facility: HOSPITAL | Age: 78
End: 2023-05-09
Payer: MEDICARE

## 2023-05-09 ENCOUNTER — LAB (OUTPATIENT)
Dept: LAB | Facility: HOSPITAL | Age: 78
End: 2023-05-09
Payer: MEDICARE

## 2023-05-09 DIAGNOSIS — E78.5 HYPERLIPIDEMIA, UNSPECIFIED HYPERLIPIDEMIA TYPE: ICD-10-CM

## 2023-05-09 DIAGNOSIS — E78.5 HYPERLIPIDEMIA, UNSPECIFIED HYPERLIPIDEMIA TYPE: Primary | ICD-10-CM

## 2023-05-09 LAB
CHOLEST SERPL-MCNC: 96 MG/DL (ref 0–200)
HDLC SERPL-MCNC: 50 MG/DL (ref 40–60)
LDLC SERPL CALC-MCNC: 27 MG/DL (ref 0–100)
LDLC/HDLC SERPL: 0.51 {RATIO}
TRIGL SERPL-MCNC: 103 MG/DL (ref 0–150)
VLDLC SERPL-MCNC: 19 MG/DL (ref 5–40)

## 2023-05-09 PROCEDURE — 80053 COMPREHEN METABOLIC PANEL: CPT

## 2023-05-09 PROCEDURE — 80061 LIPID PANEL: CPT

## 2023-05-09 PROCEDURE — 36415 COLL VENOUS BLD VENIPUNCTURE: CPT

## 2023-05-10 LAB
ALBUMIN SERPL-MCNC: 4.3 G/DL (ref 3.5–5.2)
ALBUMIN/GLOB SERPL: 1.5 G/DL
ALP SERPL-CCNC: 77 U/L (ref 39–117)
ALT SERPL W P-5'-P-CCNC: 25 U/L (ref 1–33)
ANION GAP SERPL CALCULATED.3IONS-SCNC: 12.4 MMOL/L (ref 5–15)
AST SERPL-CCNC: 25 U/L (ref 1–32)
BILIRUB SERPL-MCNC: 0.6 MG/DL (ref 0–1.2)
BUN SERPL-MCNC: 13 MG/DL (ref 8–23)
BUN/CREAT SERPL: 12.1 (ref 7–25)
CALCIUM SPEC-SCNC: 10.5 MG/DL (ref 8.6–10.5)
CHLORIDE SERPL-SCNC: 96 MMOL/L (ref 98–107)
CO2 SERPL-SCNC: 25.6 MMOL/L (ref 22–29)
CREAT SERPL-MCNC: 1.07 MG/DL (ref 0.57–1)
EGFRCR SERPLBLD CKD-EPI 2021: 53.3 ML/MIN/1.73
GLOBULIN UR ELPH-MCNC: 2.9 GM/DL
GLUCOSE SERPL-MCNC: 96 MG/DL (ref 65–99)
POTASSIUM SERPL-SCNC: 4.3 MMOL/L (ref 3.5–5.2)
PROT SERPL-MCNC: 7.2 G/DL (ref 6–8.5)
SODIUM SERPL-SCNC: 134 MMOL/L (ref 136–145)

## 2023-09-06 ENCOUNTER — OFFICE VISIT (OUTPATIENT)
Dept: PULMONOLOGY | Facility: CLINIC | Age: 78
End: 2023-09-06
Payer: MEDICARE

## 2023-09-06 VITALS
SYSTOLIC BLOOD PRESSURE: 120 MMHG | OXYGEN SATURATION: 95 % | RESPIRATION RATE: 18 BRPM | HEART RATE: 69 BPM | WEIGHT: 165 LBS | HEIGHT: 67 IN | DIASTOLIC BLOOD PRESSURE: 74 MMHG | BODY MASS INDEX: 25.9 KG/M2

## 2023-09-06 DIAGNOSIS — Z87.891 PERSONAL HISTORY OF NICOTINE DEPENDENCE: ICD-10-CM

## 2023-09-06 DIAGNOSIS — G47.33 OBSTRUCTIVE SLEEP APNEA: ICD-10-CM

## 2023-09-06 DIAGNOSIS — R91.8 LUNG NODULE, MULTIPLE: ICD-10-CM

## 2023-09-06 DIAGNOSIS — J44.9 CHRONIC OBSTRUCTIVE PULMONARY DISEASE, UNSPECIFIED COPD TYPE: Primary | ICD-10-CM

## 2023-09-06 PROCEDURE — 99214 OFFICE O/P EST MOD 30 MIN: CPT | Performed by: INTERNAL MEDICINE

## 2023-09-06 RX ORDER — ALBUTEROL SULFATE 90 UG/1
2 AEROSOL, METERED RESPIRATORY (INHALATION) EVERY 6 HOURS PRN
Qty: 18 G | Refills: 5 | Status: SHIPPED | OUTPATIENT
Start: 2023-09-06

## 2023-09-06 NOTE — PROGRESS NOTES
"  Chief Complaint   Patient presents with    Shortness of Breath    Follow-up       Subjective   Ijeoma Palacios is a 78 y.o. female.   Patient was evaluated today for follow up of shortness of breath, hypoxia and COPD.     Patient says that her symptoms have been stable since the last clinic visit. she reports no recent exacerbations.     Patient is using Anoro, as prescribed. Exercise tolerance has also remained stable.     Quit smoking 5 years ago.     Patient doesn't report any issues with the PAP device. The patient describes no significant issues with her mask either.     Patient says that the compliance with the use of the equipment is good.     Patient says that her symptoms of fatigue & daytime sleepiness have been helped greatly with the use of PAP, as prescribed.     The patient says that she is using oxygen as prescribed and feels that it appears to be helping some of her symptoms.      The following portions of the patient's history were reviewed and updated as appropriate: allergies, current medications, past family history, past medical history, past social history, and past surgical history.    Review of Systems   HENT:  Negative for sinus pressure, sneezing and sore throat.    Respiratory:  Positive for cough (some), shortness of breath (some) and wheezing (some). Negative for chest tightness.    Psychiatric/Behavioral:  Negative for sleep disturbance.      Objective   Visit Vitals  /74   Pulse 69   Resp 18   Ht 170.2 cm (67\")   Wt 74.8 kg (165 lb)   SpO2 95%   BMI 25.84 kg/m²       Physical Exam  Vitals reviewed.   Constitutional:       Appearance: She is well-developed.   HENT:      Head: Normocephalic and atraumatic.   Eyes:      Extraocular Movements: Extraocular movements intact.   Cardiovascular:      Rate and Rhythm: Normal rate.   Pulmonary:      Comments: Somewhat hyperresonant to percussion.  Somewhat decreased air entry.  No obvious wheezing noted.   Musculoskeletal:      Cervical " back: Neck supple.   Neurological:      Mental Status: She is alert.         Assessment & Plan   Diagnoses and all orders for this visit:    1. Chronic obstructive pulmonary disease, unspecified COPD type (Primary)  -     Converted Six Minute Walk; Future  -     Complete PFT - Pre & Post Bronchodilator; Future    2. Personal history of nicotine dependence  -     CT Chest Without Contrast Diagnostic; Future    3. Lung nodule, multiple  Comments:  Stable nodules. No more CTs needed.  Orders:  -     CT Chest Without Contrast Diagnostic; Future    4. Obstructive sleep apnea    Other orders  -     Umeclidinium-Vilanterol (Anoro Ellipta) 62.5-25 MCG/ACT aerosol powder  inhaler; Inhale 1 puff Daily.  Dispense: 60 each; Refill: 11  -     albuterol sulfate  (90 Base) MCG/ACT inhaler; Inhale 2 puffs Every 6 (Six) Hours As Needed for Wheezing.  Dispense: 18 g; Refill: 5           Return in about 6 months (around 3/21/2024) for CT, PFT F/U, 6MWT F/U, For Kateryna Summers), ...Also 13 mths w/[facility].    DISCUSSION (if any):  Last CT scan results was reviewed in great detail with the patient.  Results for orders placed during the hospital encounter of 09/09/22    CT Chest Without Contrast Diagnostic    Narrative  PROCEDURE: CT CHEST WO CONTRAST DIAGNOSTIC-    HISTORY: Lung nodule, 6-8mm; R91.1-Solitary pulmonary nodule    COMPARISON: CT chest screening 11/16/2021.    PROCEDURE: Axial images were obtained from the lung apex to the mid  abdomen by computed tomography. This study was performed with techniques  to keep radiation doses as low as reasonably achievable, (ALARA).  Individualized dose reduction techniques using automated exposure  control or adjustment of mA and/or kV according to the patient size were  employed.    FINDINGS:    CHEST: There is no suspicious axillary adenopathy. There is no  suspicious hilar or mediastinal adenopathy. The heart is proper size.  There is no pericardial or pleural effusion.Again  noted is the 6 mm  nodule right lower lobe at the level of the diaphragm seen on series 2  image 48. This is stable from the prior exam. Nodular scarring medially  in the right middle lobe is stable from the prior exam. Centrilobular  emphysema again noted. Minimal curvilinear scarring noted laterally in  the left upper lobe series 2 image 15. There is evidence of calcified  granulomatous disease. Limited images of the upper abdomen demonstrate a  small hiatal hernia. Vascular calcifications noted.    Impression  Stable 6 mm right lower lobe nodule and bilateral scarring  from prior exam. Recommend patient return to yearly screening in  December 2022.            This report was signed and finalized on 9/10/2022 2:41 PM by Tamara Brownlee MD.      Latest available PFTs were reviewed.  Consistent with moderate COPD. Performed Aug in 2022.    PFTs will be ordered to be done upon follow up.    We have reviewed her pulmonary medications in great detail.    Compliance with medications stressed.     Side effects of prescribed medications discussed with the patient    The patient belongs to the risk group for which lung cancer screening has been recommended. We will try to make arrangements for the same and this will be indicated soon. This has been ordered     Sleep study performed by Dr. Radford    Latest PAP device provided in July 2018?  DME company: [employer]    Current PAP settings: ???  Current mask type: Nasal PIllows    Compliance data will be obtained from the her device/DME company.    Continue PAP device on a regular basis, at least 4 hours or more per night.    Sleep hygiene measures were discussed    The patient says that she is up-to-date with influenza & pneumonia vaccinations.       Dictated utilizing Dragon dictation.    This document was electronically signed by Cathy Cheek MD on 09/06/23 at 15:00 EDT

## 2023-11-09 ENCOUNTER — TRANSCRIBE ORDERS (OUTPATIENT)
Dept: LAB | Facility: HOSPITAL | Age: 78
End: 2023-11-09
Payer: MEDICARE

## 2023-11-09 ENCOUNTER — HOSPITAL ENCOUNTER (OUTPATIENT)
Dept: CT IMAGING | Facility: HOSPITAL | Age: 78
Discharge: HOME | End: 2023-11-09
Admitting: INTERNAL MEDICINE
Payer: MEDICARE

## 2023-11-09 ENCOUNTER — LAB (OUTPATIENT)
Dept: LAB | Facility: HOSPITAL | Age: 78
End: 2023-11-09
Payer: MEDICARE

## 2023-11-09 DIAGNOSIS — Z87.891 PERSONAL HISTORY OF NICOTINE DEPENDENCE: ICD-10-CM

## 2023-11-09 DIAGNOSIS — E78.5 HYPERLIPIDEMIA, UNSPECIFIED HYPERLIPIDEMIA TYPE: ICD-10-CM

## 2023-11-09 DIAGNOSIS — R91.8 LUNG NODULE, MULTIPLE: ICD-10-CM

## 2023-11-09 DIAGNOSIS — E78.5 HYPERLIPIDEMIA, UNSPECIFIED HYPERLIPIDEMIA TYPE: Primary | ICD-10-CM

## 2023-11-09 LAB
ALBUMIN SERPL-MCNC: 4.6 G/DL (ref 3.5–5.2)
ALBUMIN/GLOB SERPL: 2.1 G/DL
ALP SERPL-CCNC: 70 U/L (ref 39–117)
ALT SERPL W P-5'-P-CCNC: 22 U/L (ref 1–33)
ANION GAP SERPL CALCULATED.3IONS-SCNC: 9 MMOL/L (ref 5–15)
AST SERPL-CCNC: 24 U/L (ref 1–32)
BASOPHILS # BLD AUTO: 0.05 10*3/MM3 (ref 0–0.2)
BASOPHILS NFR BLD AUTO: 0.7 % (ref 0–1.5)
BILIRUB SERPL-MCNC: 0.4 MG/DL (ref 0–1.2)
BUN SERPL-MCNC: 12 MG/DL (ref 8–23)
BUN/CREAT SERPL: 10.3 (ref 7–25)
CALCIUM SPEC-SCNC: 10 MG/DL (ref 8.6–10.5)
CHLORIDE SERPL-SCNC: 104 MMOL/L (ref 98–107)
CHOLEST SERPL-MCNC: 104 MG/DL (ref 0–200)
CO2 SERPL-SCNC: 26 MMOL/L (ref 22–29)
CREAT SERPL-MCNC: 1.17 MG/DL (ref 0.57–1)
DEPRECATED RDW RBC AUTO: 39.8 FL (ref 37–54)
EGFRCR SERPLBLD CKD-EPI 2021: 47.6 ML/MIN/1.73
EOSINOPHIL # BLD AUTO: 0.19 10*3/MM3 (ref 0–0.4)
EOSINOPHIL NFR BLD AUTO: 2.7 % (ref 0.3–6.2)
ERYTHROCYTE [DISTWIDTH] IN BLOOD BY AUTOMATED COUNT: 12.2 % (ref 12.3–15.4)
GLOBULIN UR ELPH-MCNC: 2.2 GM/DL
GLUCOSE SERPL-MCNC: 101 MG/DL (ref 65–99)
HCT VFR BLD AUTO: 35.8 % (ref 34–46.6)
HDLC SERPL-MCNC: 57 MG/DL (ref 40–60)
HGB BLD-MCNC: 12.3 G/DL (ref 12–15.9)
IMM GRANULOCYTES # BLD AUTO: 0.03 10*3/MM3 (ref 0–0.05)
IMM GRANULOCYTES NFR BLD AUTO: 0.4 % (ref 0–0.5)
LDLC SERPL CALC-MCNC: 30 MG/DL (ref 0–100)
LDLC/HDLC SERPL: 0.52 {RATIO}
LYMPHOCYTES # BLD AUTO: 2.25 10*3/MM3 (ref 0.7–3.1)
LYMPHOCYTES NFR BLD AUTO: 32.2 % (ref 19.6–45.3)
MCH RBC QN AUTO: 30.8 PG (ref 26.6–33)
MCHC RBC AUTO-ENTMCNC: 34.4 G/DL (ref 31.5–35.7)
MCV RBC AUTO: 89.5 FL (ref 79–97)
MONOCYTES # BLD AUTO: 0.51 10*3/MM3 (ref 0.1–0.9)
MONOCYTES NFR BLD AUTO: 7.3 % (ref 5–12)
NEUTROPHILS NFR BLD AUTO: 3.95 10*3/MM3 (ref 1.7–7)
NEUTROPHILS NFR BLD AUTO: 56.7 % (ref 42.7–76)
NRBC BLD AUTO-RTO: 0 /100 WBC (ref 0–0.2)
PLATELET # BLD AUTO: 321 10*3/MM3 (ref 140–450)
PMV BLD AUTO: 11.3 FL (ref 6–12)
POTASSIUM SERPL-SCNC: 4.2 MMOL/L (ref 3.5–5.2)
PROT SERPL-MCNC: 6.8 G/DL (ref 6–8.5)
RBC # BLD AUTO: 4 10*6/MM3 (ref 3.77–5.28)
SODIUM SERPL-SCNC: 139 MMOL/L (ref 136–145)
TRIGL SERPL-MCNC: 86 MG/DL (ref 0–150)
VLDLC SERPL-MCNC: 17 MG/DL (ref 5–40)
WBC NRBC COR # BLD: 6.98 10*3/MM3 (ref 3.4–10.8)

## 2023-11-09 PROCEDURE — 71250 CT THORAX DX C-: CPT

## 2023-11-09 PROCEDURE — 80061 LIPID PANEL: CPT

## 2023-11-09 PROCEDURE — 36415 COLL VENOUS BLD VENIPUNCTURE: CPT

## 2023-11-09 PROCEDURE — 85025 COMPLETE CBC W/AUTO DIFF WBC: CPT

## 2023-11-09 PROCEDURE — 80053 COMPREHEN METABOLIC PANEL: CPT

## 2023-11-28 ENCOUNTER — TELEPHONE (OUTPATIENT)
Dept: PULMONOLOGY | Facility: CLINIC | Age: 78
End: 2023-11-28

## 2023-11-28 NOTE — TELEPHONE ENCOUNTER
Caller: Ijeoma Palacios    Relationship to patient: Self    Best call back number: [phone]    Patient is needing: PT CALLING TO SEE IF WE HAVE ANY SAMPLES OF BRO. PLEASE CALL PT TO ADVISE

## 2024-03-04 ENCOUNTER — OFFICE VISIT (OUTPATIENT)
Dept: PULMONOLOGY | Facility: CLINIC | Age: 79
End: 2024-03-04
Payer: MEDICARE

## 2024-03-04 VITALS
DIASTOLIC BLOOD PRESSURE: 72 MMHG | HEIGHT: 67 IN | OXYGEN SATURATION: 96 % | BODY MASS INDEX: 25.9 KG/M2 | HEART RATE: 70 BPM | SYSTOLIC BLOOD PRESSURE: 116 MMHG | WEIGHT: 165 LBS

## 2024-03-04 DIAGNOSIS — J44.9 CHRONIC OBSTRUCTIVE PULMONARY DISEASE, UNSPECIFIED COPD TYPE: Primary | ICD-10-CM

## 2024-03-04 DIAGNOSIS — J44.9 CHRONIC OBSTRUCTIVE PULMONARY DISEASE, UNSPECIFIED COPD TYPE: ICD-10-CM

## 2024-03-04 DIAGNOSIS — G47.33 OSA ON CPAP: ICD-10-CM

## 2024-03-04 DIAGNOSIS — M79.604 RIGHT LEG PAIN: ICD-10-CM

## 2024-03-04 RX ORDER — FLUTICASONE FUROATE AND VILANTEROL TRIFENATATE 200; 25 UG/1; UG/1
POWDER RESPIRATORY (INHALATION)
COMMUNITY
Start: 2024-02-20

## 2024-03-04 RX ORDER — NITROGLYCERIN 0.4 MG/1
TABLET SUBLINGUAL AS NEEDED
COMMUNITY

## 2024-03-04 NOTE — PROGRESS NOTES
Follow Up Office Visit      Patient Name: Ijeoma Palacios    Chief Complaint:    Chief Complaint   Patient presents with    Shortness of Breath       History of Present Illness: Ijeoma Palacios is a 78 y.o. female who is here today for follow up of COPD and sleep apnea.  Since last visit, she denies having any exacerbations.  Her dyspnea is at baseline.  Reports compliance with Breo.  No increase in use of short acting beta agonist use.  Continues on CPAP nightly.  She reports that she has been experiencing right lower leg pain over the past ~4 months. The pain has been awakening her in the middle of the night; no associated swelling or redness.  She has been putting some lidocaine lotion on her leg, which has helped some.    Associated Symptoms: Exertional dyspnea, chronic cough occasionally productive, post nasal drainage, occasional wheezing,  Supplemental Oxygen: 2L qhs and PRN during the day  Occupational history: [employer]    Subjective      Review of Systems:  Review of Systems   Constitutional:  Negative for fever and unexpected weight change.   HENT:  Positive for postnasal drip.    Respiratory:  Positive for cough, shortness of breath and wheezing.    Cardiovascular:  Negative for chest pain and leg swelling.   Musculoskeletal:         Right lower leg pain        Past Medical History:   Past Medical History:   Diagnosis Date    Basal cell carcinoma     COPD (chronic obstructive pulmonary disease)     Emphysema of lung     Hypertension     Sleep apnea, obstructive        Past Surgical History:   Past Surgical History:   Procedure Laterality Date    CARDIAC CATHETERIZATION N/A 1/14/2019    Procedure: Left Heart Cath;  Surgeon: Suraj Quintero MD;  Location: [facility] INVASIVE LOCATION;  Service: Cardiology    CARPAL TUNNEL RELEASE      HYSTERECTOMY      SHOULDER SURGERY      TRIGGER FINGER RELEASE         Family History: History reviewed. No pertinent family history.    Social  History:   Social History     Socioeconomic History    Marital status: Unknown   Tobacco Use    Smoking status: Former     Current packs/day: 0.00     Average packs/day: 1 pack/day for 55.0 years (55.0 ttl pk-yrs)     Types: Cigarettes     Start date:      Quit date: 2019     Years since quittin.1     Passive exposure: Past    Smokeless tobacco: Never   Vaping Use    Vaping status: Never Used   Substance and Sexual Activity    Alcohol use: No    Drug use: No    Sexual activity: Defer       Current Medications:     Current Outpatient Medications:     albuterol sulfate  (90 Base) MCG/ACT inhaler, Inhale 2 puffs Every 6 (Six) Hours As Needed for Wheezing., Disp: 18 g, Rfl: 5    aspirin 81 MG EC tablet, Take 1 tablet by mouth Daily., Disp: 30 tablet, Rfl: 3    atorvastatin (LIPITOR) 80 MG tablet, Take 1 tablet by mouth Every Night., Disp: 30 tablet, Rfl: 0    Breo Ellipta 200-25 MCG/ACT inhaler, , Disp: , Rfl:     Calcium Carbonate-Vitamin D (CALCIUM 600+D PO), Calcium 600  1 po QDay, Disp: , Rfl:     cetirizine (zyrTEC) 10 MG tablet, Take 1 tablet by mouth Daily., Disp: , Rfl:     clopidogrel (PLAVIX) 75 MG tablet, Take 1 tablet by mouth Daily., Disp: 30 tablet, Rfl: 3    fluticasone (FLONASE) 50 MCG/ACT nasal spray, 2 sprays into the nostril(s) as directed by provider Daily., Disp: , Rfl:     ipratropium-albuterol (DUO-NEB) 0.5-2.5 mg/3 ml nebulizer, Take 3 mL by nebulization Every 4 (Four) Hours As Needed for Wheezing., Disp: , Rfl:     losartan (COZAAR) 50 MG tablet, Take 1 tablet by mouth Daily., Disp: 30 tablet, Rfl: 3    metoprolol succinate XL (TOPROL-XL) 25 MG 24 hr tablet, Take 1 tablet by mouth Daily., Disp: 30 tablet, Rfl: 3    multivitamin with minerals tablet tablet, multivitamin  1 po QDay, Disp: , Rfl:     spironolactone (ALDACTONE) 25 MG tablet, Take 1 tablet by mouth Daily., Disp: 30 tablet, Rfl: 3    nitroglycerin (NITROSTAT) 0.4 MG SL tablet, As Needed., Disp: , Rfl:      Allergies:  "  No Known Allergies    Objective     Physical Exam:  Vital Signs:   Vitals:    03/04/24 1514   BP: 116/72   Pulse: 70   SpO2: 96%   Weight: 74.8 kg (165 lb)   Height: 170.2 cm (67\")   ============================  ============================    6 MINUTE WALK TEST    Ijeoma Palacios   1945             BASELINE   SpO2%: 96 % RA    Heart Rate 70   Blood Pressure 116 72     EXERCISE SpO2% HEART RATE RA or O2 @ LPM   1 MINUTE 96 71 RA   2 MINUTES 94 76 RA   3 MINUTES 95 77 RA   4 MINUTES 95 78 RA   5 MINUTES 95 80 RA   6 MINUTES 95 88 RA   (Number of laps: 4 X 36 meters + Final partial lap: 0 meters = 144 meters)            Distance Walked:  144 Meters   SpO2% Post Exercise:  95 %   HR Post Exercise:  77     Reason to stop (if applicable):   ____ Chest Pain   ____ Light Headedness   ____ Dyspnea Unrelieved by Rest   ____ Abnormal Gait Pattern   ____ Severe Fatigue   ____ Other (Specify: __________________________)    Tech Comments (if any): NA     Test performed by: BB    ============================  ============================    Body mass index is 25.84 kg/m².    Physical Exam  Vitals reviewed.   Constitutional:       General: She is not in acute distress.     Appearance: She is not toxic-appearing.   HENT:      Head: Normocephalic and atraumatic.      Mouth/Throat:      Mouth: Mucous membranes are moist.   Eyes:      Conjunctiva/sclera: Conjunctivae normal.   Cardiovascular:      Rate and Rhythm: Normal rate.      Heart sounds: Normal heart sounds.   Pulmonary:      Effort: Pulmonary effort is normal.      Breath sounds: Normal breath sounds.   Abdominal:      General: There is no distension.      Palpations: Abdomen is soft.   Musculoskeletal:         General: No swelling.      Cervical back: Neck supple.   Skin:     General: Skin is warm and dry.      Findings: No rash.   Neurological:      General: No focal deficit present.      Mental Status: She is alert and oriented to person, place, and time. "   Psychiatric:         Mood and Affect: Mood normal.         Behavior: Behavior normal.     Results Review:   PFTs obtained today showed severe obstruction with no significant bronchodilator response.  Postbronchodilator FEV1 of 47%.  Hyperinflation and air trapping noted.  Normal DLCO/VA.    November 2023 chest CT scan showed stable bilateral subcentimeter noncalcified pulmonary nodules, benign.  Possible gallstone.    Assessment / Plan      Assessment/Plan:   Diagnoses and all orders for this visit:    1. Chronic obstructive pulmonary disease, unspecified COPD type (Primary)  No drop in O2 saturation per walk test performed today.  Continue current inhaled regimen. We discussed the risk and benefits of inhaled corticosteroids. Patient instructed to take them on a regular basis as prescribed. Patient instructed to rinse their mouth out after each use.     2. Right leg pain  -     US Venous Doppler Lower Extremity Right (duplex); Future  R/o DVT and patient will otherwise continue to follow up with her PCP about her leg pain.    3. RANJAN on CPAP  Continue on CPAP nightly.       Follow Up:   October 2024 as previously scheduled  The patient was counseled on diagnostic results, risks and benefits of treatment options, risk factor modifications and the importance of treatment compliance. The patient was advised to contact the clinic with concerns or worsening symptoms.     HIMANSHU Young   Pulmonary Medicine [place]

## 2024-07-17 ENCOUNTER — TELEPHONE (OUTPATIENT)
Dept: PULMONOLOGY | Facility: CLINIC | Age: 79
End: 2024-07-17
Payer: MEDICARE

## 2024-07-17 NOTE — TELEPHONE ENCOUNTER
Called patient, no answer, no vm available    Called patient to see if she has had her ultrasound (venous Doppler) done.

## 2024-10-08 ENCOUNTER — OFFICE VISIT (OUTPATIENT)
Dept: PULMONOLOGY | Facility: CLINIC | Age: 79
End: 2024-10-08
Payer: MEDICARE

## 2024-10-08 VITALS
OXYGEN SATURATION: 95 % | DIASTOLIC BLOOD PRESSURE: 78 MMHG | HEART RATE: 63 BPM | SYSTOLIC BLOOD PRESSURE: 128 MMHG | RESPIRATION RATE: 18 BRPM | HEIGHT: 67 IN | BODY MASS INDEX: 24.64 KG/M2 | WEIGHT: 157 LBS

## 2024-10-08 DIAGNOSIS — G47.33 OSA ON CPAP: ICD-10-CM

## 2024-10-08 DIAGNOSIS — J44.9 CHRONIC OBSTRUCTIVE PULMONARY DISEASE, UNSPECIFIED COPD TYPE: Primary | ICD-10-CM

## 2024-10-08 DIAGNOSIS — Z87.891 PERSONAL HISTORY OF NICOTINE DEPENDENCE: ICD-10-CM

## 2024-10-08 PROCEDURE — 99214 OFFICE O/P EST MOD 30 MIN: CPT | Performed by: NURSE PRACTITIONER

## 2024-10-08 RX ORDER — MONTELUKAST SODIUM 10 MG/1
10 TABLET ORAL NIGHTLY
Qty: 90 TABLET | Refills: 2 | Status: SHIPPED | OUTPATIENT
Start: 2024-10-08

## 2024-10-08 RX ORDER — METOPROLOL SUCCINATE 50 MG/1
TABLET, EXTENDED RELEASE ORAL
COMMUNITY
Start: 2024-08-25

## 2024-10-08 RX ORDER — MONTELUKAST SODIUM 10 MG/1
TABLET ORAL
COMMUNITY
Start: 2024-08-13 | End: 2024-10-08 | Stop reason: SDUPTHER

## 2024-10-08 RX ORDER — ALBUTEROL SULFATE 90 UG/1
2 INHALANT RESPIRATORY (INHALATION) EVERY 6 HOURS PRN
Qty: 18 G | Refills: 5 | Status: SHIPPED | OUTPATIENT
Start: 2024-10-08

## 2024-10-08 NOTE — PROGRESS NOTES
"Chief Complaint   Patient presents with    Follow-up    Breathing Problem         Subjective   Ijeoma Palacios is a 79 y.o. female.   Patient comes today for follow up of shortness of breath and COPD.     Symptoms have been stable since the last clinic visit. Patient reports no recent exacerbations.     Patient is using medications, as prescribed. She uses Breo daily. She uses ODETTE a few times a week but not everyday. She has not needed neb recently.     However, she states trelegy works better than breo and she is in the donut hole.    She takes singulair nightly and zyrtec daily.     She uses Flonase as needed.     Exercise tolerance has also remained stable.     Quit smoking 5 years ago.     She uses CPAP nightly. She states she has had machine 5-6 years and the machine acts funny at times.       The following portions of the patient's history were reviewed and updated as appropriate: allergies, current medications, past family history, past medical history, past social history, and past surgical history.      Review of Systems   HENT:  Positive for postnasal drip. Negative for sinus pressure, sneezing and sore throat.    Respiratory:  Negative for cough (some), chest tightness, shortness of breath and wheezing (some).        Objective   Visit Vitals  /78   Pulse 63   Resp 18   Ht 170.2 cm (67.01\") Comment: pt reported   Wt 71.2 kg (157 lb)   SpO2 95%   BMI 24.58 kg/m²       Physical Exam  Vitals reviewed.   HENT:      Head: Atraumatic.      Mouth/Throat:      Mouth: Mucous membranes are moist.   Eyes:      Extraocular Movements: Extraocular movements intact.   Cardiovascular:      Rate and Rhythm: Normal rate and regular rhythm.   Pulmonary:      Effort: Pulmonary effort is normal. No respiratory distress.      Comments: Somewhat decreased A/E without wheezing.   Neurological:      Mental Status: She is alert and oriented to person, place, and time.         Assessment & Plan   Diagnoses and all orders for " this visit:    1. Chronic obstructive pulmonary disease, unspecified COPD type (Primary)    2. RANJAN on CPAP    3. Personal history of nicotine dependence    Other orders  -     albuterol sulfate  (90 Base) MCG/ACT inhaler; Inhale 2 puffs Every 6 (Six) Hours As Needed for Wheezing.  Dispense: 18 g; Refill: 5  -     montelukast (SINGULAIR) 10 MG tablet; Take 1 tablet by mouth Every Night.  Dispense: 90 tablet; Refill: 2  -     Fluticasone-Umeclidin-Vilant (TRELEGY) 100-62.5-25 MCG/ACT inhaler; Inhale 1 puff Daily. Rinse mouth with water after use.  Dispense: 60 each; Refill: 7           Return in about 6 months (around 4/8/2025) for Recheck, For Dr. Cheek.    DISCUSSION (if any):  PFT 3/2024 consistent with severe obstruction.     She did not need oxygen on 6 MWT at last visit.     I have switched her to Trelegy from [facility] due to trelegy working better and PFT consistent with severe obstruction.     I will provide paper work for Trelegy for those in donut hole provided by the pharmaceutical company. Samples provided today also.     I have advised her to use Flonase daily for uncontrolled AR symptoms for the last 2-3 weeks. Continue zyrtec and singulair.     Compliance with medications stressed.     Side effects of prescribed medications discussed with the patient    She no longer meets LDCT guidelines.    Study Result    Narrative & Impression   PROCEDURE: CT CHEST WO CONTRAST DIAGNOSTIC-     HISTORY: Abnormal xray - lung nodule, < 1 cm, mod-high risk;  Z87.891-Personal history of nicotine dependence; R91.8-Other nonspecific  abnormal finding of lung field     COMPARISON: September 9, 2022.     PROCEDURE: Axial images were obtained from the lung apex to the mid  abdomen by computed tomography. This study was performed with techniques  to keep radiation doses as low as reasonably achievable, (ALARA).  Individualized dose reduction techniques using automated exposure  control or adjustment of mA and/or kV according  to the patient size were  employed.     FINDINGS:     CHEST: There is no suspicious axillary adenopathy. There is no  suspicious hilar or mediastinal adenopathy. The heart is proper size.  There is no pericardial or pleural effusion.No new suspicious infiltrate  or nodule identified. There is a stable fissural nodule on the left,  likely lymph node. 6 mm right lower lobe nodule is stable from prior  exam. Mild emphysematous change noted. Limited images of the upper  abdomen demonstrate a small rounded lesion of mildly increased  attenuation dependent gallbladder, possible stones. Recommend  gallbladder ultrasound. Vascular calcifications noted. There is evidence  of previous calcified granulomatous disease. Small hiatal hernia is  stable from the prior exam.     IMPRESSION:  Stable, bilateral subcentimeter noncalcified pulmonary  nodules, benign. Recommend return to yearly low-dose screening if  patient continues to qualify.     Possible gallstone, recommend gallbladder ultrasound.           CTDI: 2.97 mGy  DLP:122.25 mGy.cm     This report was signed and finalized on 11/9/2023 11:30 AM by Tamara Brownlee MD.          Sleep study performed by Dr. Radford     Latest PAP device provided in July 2018?  DME company: [employer]     Current PAP settings: 5-12  Current mask type: Nasal PIllows    Continue AutoPAP 5-12 cm.     We will plan to order new CPAP at next visit due to having to have 31-90 day compliance and she does not want to travel in winter.       Dictated utilizing Dragon dictation.    This document was electronically signed by HIMANSHU Oliva October 8, 2024  15:03 EDT

## 2024-11-08 ENCOUNTER — LAB (OUTPATIENT)
Dept: LAB | Facility: HOSPITAL | Age: 79
End: 2024-11-08
Payer: MEDICARE

## 2024-11-08 ENCOUNTER — TRANSCRIBE ORDERS (OUTPATIENT)
Dept: LAB | Facility: HOSPITAL | Age: 79
End: 2024-11-08
Payer: MEDICARE

## 2024-11-08 DIAGNOSIS — E78.5 HYPERLIPIDEMIA, UNSPECIFIED HYPERLIPIDEMIA TYPE: ICD-10-CM

## 2024-11-08 DIAGNOSIS — E78.5 HYPERLIPIDEMIA, UNSPECIFIED HYPERLIPIDEMIA TYPE: Primary | ICD-10-CM

## 2024-11-08 LAB
ALBUMIN SERPL-MCNC: 3.5 G/DL (ref 3.5–5.2)
ALBUMIN/GLOB SERPL: 1 G/DL
ALP SERPL-CCNC: 98 U/L (ref 39–117)
ALT SERPL W P-5'-P-CCNC: 17 U/L (ref 1–33)
ANION GAP SERPL CALCULATED.3IONS-SCNC: 9.3 MMOL/L (ref 5–15)
AST SERPL-CCNC: 24 U/L (ref 1–32)
BASOPHILS # BLD AUTO: 0.05 10*3/MM3 (ref 0–0.2)
BASOPHILS NFR BLD AUTO: 0.7 % (ref 0–1.5)
BILIRUB SERPL-MCNC: 0.7 MG/DL (ref 0–1.2)
BUN SERPL-MCNC: 9 MG/DL (ref 8–23)
BUN/CREAT SERPL: 8.5 (ref 7–25)
CALCIUM SPEC-SCNC: 9.9 MG/DL (ref 8.6–10.5)
CHLORIDE SERPL-SCNC: 96 MMOL/L (ref 98–107)
CHOLEST SERPL-MCNC: 92 MG/DL (ref 0–200)
CO2 SERPL-SCNC: 26.7 MMOL/L (ref 22–29)
CREAT SERPL-MCNC: 1.06 MG/DL (ref 0.57–1)
DEPRECATED RDW RBC AUTO: 40.9 FL (ref 37–54)
EGFRCR SERPLBLD CKD-EPI 2021: 53.2 ML/MIN/1.73
EOSINOPHIL # BLD AUTO: 0.15 10*3/MM3 (ref 0–0.4)
EOSINOPHIL NFR BLD AUTO: 2 % (ref 0.3–6.2)
ERYTHROCYTE [DISTWIDTH] IN BLOOD BY AUTOMATED COUNT: 12.5 % (ref 12.3–15.4)
GLOBULIN UR ELPH-MCNC: 3.6 GM/DL
GLUCOSE SERPL-MCNC: 88 MG/DL (ref 65–99)
HCT VFR BLD AUTO: 36.1 % (ref 34–46.6)
HDLC SERPL-MCNC: 48 MG/DL (ref 40–60)
HGB BLD-MCNC: 12.1 G/DL (ref 12–15.9)
IMM GRANULOCYTES # BLD AUTO: 0.02 10*3/MM3 (ref 0–0.05)
IMM GRANULOCYTES NFR BLD AUTO: 0.3 % (ref 0–0.5)
LDLC SERPL CALC-MCNC: 27 MG/DL (ref 0–100)
LDLC/HDLC SERPL: 0.57 {RATIO}
LYMPHOCYTES # BLD AUTO: 1.88 10*3/MM3 (ref 0.7–3.1)
LYMPHOCYTES NFR BLD AUTO: 25.4 % (ref 19.6–45.3)
MCH RBC QN AUTO: 30.3 PG (ref 26.6–33)
MCHC RBC AUTO-ENTMCNC: 33.5 G/DL (ref 31.5–35.7)
MCV RBC AUTO: 90.5 FL (ref 79–97)
MONOCYTES # BLD AUTO: 0.51 10*3/MM3 (ref 0.1–0.9)
MONOCYTES NFR BLD AUTO: 6.9 % (ref 5–12)
NEUTROPHILS NFR BLD AUTO: 4.78 10*3/MM3 (ref 1.7–7)
NEUTROPHILS NFR BLD AUTO: 64.7 % (ref 42.7–76)
NRBC BLD AUTO-RTO: 0 /100 WBC (ref 0–0.2)
PLATELET # BLD AUTO: 357 10*3/MM3 (ref 140–450)
PMV BLD AUTO: 10.7 FL (ref 6–12)
POTASSIUM SERPL-SCNC: 4.7 MMOL/L (ref 3.5–5.2)
PROT SERPL-MCNC: 7.1 G/DL (ref 6–8.5)
RBC # BLD AUTO: 3.99 10*6/MM3 (ref 3.77–5.28)
SODIUM SERPL-SCNC: 132 MMOL/L (ref 136–145)
TRIGL SERPL-MCNC: 84 MG/DL (ref 0–150)
VLDLC SERPL-MCNC: 17 MG/DL (ref 5–40)
WBC NRBC COR # BLD AUTO: 7.39 10*3/MM3 (ref 3.4–10.8)

## 2024-11-08 PROCEDURE — 80061 LIPID PANEL: CPT

## 2024-11-08 PROCEDURE — 80053 COMPREHEN METABOLIC PANEL: CPT

## 2024-11-08 PROCEDURE — 36415 COLL VENOUS BLD VENIPUNCTURE: CPT

## 2024-11-08 PROCEDURE — 85025 COMPLETE CBC W/AUTO DIFF WBC: CPT

## 2024-11-15 ENCOUNTER — TELEPHONE (OUTPATIENT)
Dept: OBSTETRICS AND GYNECOLOGY | Facility: CLINIC | Age: 79
End: 2024-11-15
Payer: MEDICARE

## 2024-12-09 ENCOUNTER — TELEPHONE (OUTPATIENT)
Dept: PULMONOLOGY | Facility: CLINIC | Age: 79
End: 2024-12-09
Payer: MEDICARE

## 2024-12-09 NOTE — TELEPHONE ENCOUNTER
----- Message from Jenna LERMA sent at 12/9/2024  9:48 AM EST -----  Attempted to call patient and her son with no answer. Spoke with her emergency contact asked to have the pt to contact us about scheduling. Tried to give our phone number and she said she would just tell her to contact [facility].  ----- Message -----  From: Elaine Gomez MA  Sent: 12/9/2024   8:59 AM EST  To: River López      ----- Message -----  From: SYSTEM  Sent: 4/8/2024   1:56 AM EST  To: Earl [facility]

## 2025-01-02 ENCOUNTER — TELEPHONE (OUTPATIENT)
Dept: PULMONOLOGY | Facility: CLINIC | Age: 80
End: 2025-01-02
Payer: MEDICARE

## 2025-02-10 ENCOUNTER — TELEPHONE (OUTPATIENT)
Dept: PULMONOLOGY | Facility: CLINIC | Age: 80
End: 2025-02-10
Payer: MEDICARE

## 2025-02-10 NOTE — TELEPHONE ENCOUNTER
Caller: Ijeoma Palacios    Relationship to patient: Self    Best call back number: [phone]     Patient is needing: PATIENT ATTEMPTED TO FILL HER TRELEGY. HOWEVER, IT WILL COST HER ALMOST $500 OUT OF POCKET. SHE IS TRYING TO FIND OUT WHAT OTHER OPTIONS SHE HAS.    SHE CURRENTLY ONLY HAS 2 DAYS OF TRELEGY LEFT.

## 2025-02-10 NOTE — TELEPHONE ENCOUNTER
I advised patient she should call the RX member services on the back of her insurance card to see what her insurance prefers.

## 2025-04-16 ENCOUNTER — OFFICE VISIT (OUTPATIENT)
Dept: PULMONOLOGY | Facility: CLINIC | Age: 80
End: 2025-04-16
Payer: MEDICARE

## 2025-04-16 VITALS
BODY MASS INDEX: 24.33 KG/M2 | WEIGHT: 155 LBS | OXYGEN SATURATION: 94 % | SYSTOLIC BLOOD PRESSURE: 115 MMHG | HEIGHT: 67 IN | DIASTOLIC BLOOD PRESSURE: 62 MMHG | RESPIRATION RATE: 18 BRPM | HEART RATE: 64 BPM

## 2025-04-16 DIAGNOSIS — J44.9 CHRONIC OBSTRUCTIVE PULMONARY DISEASE, UNSPECIFIED COPD TYPE: Primary | ICD-10-CM

## 2025-04-16 DIAGNOSIS — Z87.891 PERSONAL HISTORY OF NICOTINE DEPENDENCE: ICD-10-CM

## 2025-04-16 DIAGNOSIS — G47.33 OBSTRUCTIVE SLEEP APNEA: ICD-10-CM

## 2025-04-16 DIAGNOSIS — J30.89 OTHER ALLERGIC RHINITIS: ICD-10-CM

## 2025-04-16 PROCEDURE — 99214 OFFICE O/P EST MOD 30 MIN: CPT | Performed by: INTERNAL MEDICINE

## 2025-04-16 RX ORDER — FLUTICASONE PROPIONATE 50 MCG
2 SPRAY, SUSPENSION (ML) NASAL DAILY
Qty: 16 G | Refills: 5 | Status: SHIPPED | OUTPATIENT
Start: 2025-04-16

## 2025-04-16 NOTE — PROGRESS NOTES
"  Chief Complaint   Patient presents with    Shortness of Breath    Follow-up         Subjective   Ijeoma Palacios is a 79 y.o. female.   Patient was evaluated today for follow up of shortness of breath, RANJAN and COPD.     Patient says that her symptoms have been stable since the last clinic visit. she reports no recent exacerbations.      Patient is using Trelegy, as prescribed. Exercise tolerance has also remained stable but limited due to use of a cane.     Quit smoking 5 years ago.     Patient says that she has been using her nasal sprays on a regular basis and describes no significant ongoing issues other than occasional congestion.     Patient doesn't report any issues with the PAP device. The patient describes no significant issues with her mask either.     Patient says that the compliance with the use of the equipment is good.     Patient says that her symptoms of fatigue & daytime sleepiness have been helped greatly with the use of PAP, as prescribed.       The following portions of the patient's history were reviewed and updated as appropriate: allergies, current medications, past family history, past medical history, past social history, and past surgical history.    Review of Systems   HENT:  Negative for sinus pressure, sneezing and sore throat.    Respiratory:  Positive for cough and wheezing (some). Negative for chest tightness and shortness of breath.    Psychiatric/Behavioral:  Negative for sleep disturbance.        Objective   Visit Vitals  /62   Pulse 64   Resp 18   Ht 170.2 cm (67\") Comment: pt reported   Wt 70.3 kg (155 lb)   SpO2 94%   BMI 24.28 kg/m²       BMI Readings from Last 8 Encounters:   04/16/25 24.28 kg/m²   10/08/24 24.58 kg/m²   03/04/24 25.84 kg/m²   09/06/23 25.84 kg/m²   08/11/22 29.21 kg/m²   10/26/21 28.41 kg/m²   12/04/19 25.25 kg/m²   01/17/19 20.38 kg/m²       Physical Exam  Vitals reviewed.   Constitutional:       Appearance: She is well-developed.   HENT:      Head: " Normocephalic and atraumatic.   Eyes:      Extraocular Movements: Extraocular movements intact.   Cardiovascular:      Rate and Rhythm: Normal rate.   Pulmonary:      Comments: Somewhat hyperresonant to percussion.  Somewhat decreased air entry.  No obvious wheezing noted.   Musculoskeletal:      Cervical back: Neck supple.      Comments: Used a cane.   Neurological:      Mental Status: She is alert.           Assessment & Plan   Diagnoses and all orders for this visit:    1. Chronic obstructive pulmonary disease, unspecified COPD type (Primary)    2. Personal history of nicotine dependence    3. Obstructive sleep apnea  -     BIPAP / CPAP Adjustment    4. Other allergic rhinitis    Other orders  -     Fluticasone-Umeclidin-Vilant (TRELEGY) 100-62.5-25 MCG/ACT inhaler; Inhale 1 puff Daily. Rinse mouth with water after use.  Dispense: 60 each; Refill: 7  -     fluticasone (FLONASE) 50 MCG/ACT nasal spray; Administer 2 sprays into the nostril(s) as directed by provider Daily.  Dispense: 16 g; Refill: 5           Return in about 4 months (around 8/15/2025) for Recheck, For [place] ([place]), ....Also 13-14 mths w/ Dr. Cheek.    DISCUSSION (if any):  Last CT scan results was reviewed in great detail with the patient.  Results for orders placed during the hospital encounter of 11/09/23    CT Chest Without Contrast Diagnostic    Narrative  PROCEDURE: CT CHEST WO CONTRAST DIAGNOSTIC-    HISTORY: Abnormal xray - lung nodule, < 1 cm, mod-high risk;  Z87.891-Personal history of nicotine dependence; R91.8-Other nonspecific  abnormal finding of lung field    COMPARISON: September 9, 2022.    PROCEDURE: Axial images were obtained from the lung apex to the mid  abdomen by computed tomography. This study was performed with techniques  to keep radiation doses as low as reasonably achievable, (ALARA).  Individualized dose reduction techniques using automated exposure  control or adjustment of mA and/or kV according to the patient  size were  employed.    FINDINGS:    CHEST: There is no suspicious axillary adenopathy. There is no  suspicious hilar or mediastinal adenopathy. The heart is proper size.  There is no pericardial or pleural effusion.No new suspicious infiltrate  or nodule identified. There is a stable fissural nodule on the left,  likely lymph node. 6 mm right lower lobe nodule is stable from prior  exam. Mild emphysematous change noted. Limited images of the upper  abdomen demonstrate a small rounded lesion of mildly increased  attenuation dependent gallbladder, possible stones. Recommend  gallbladder ultrasound. Vascular calcifications noted. There is evidence  of previous calcified granulomatous disease. Small hiatal hernia is  stable from the prior exam.    Impression  Stable, bilateral subcentimeter noncalcified pulmonary  nodules, benign. Recommend return to yearly low-dose screening if  patient continues to qualify.    Possible gallstone, recommend gallbladder ultrasound.        CTDI: 2.97 mGy  DLP:122.25 mGy.cm    This report was signed and finalized on 11/9/2023 11:30 AM by Tamara Brownlee MD.        Latest available PFTs were reviewed.  Consistent with severe COPD. Performed in March 2024.    PFTs will be ordered to be done upon follow up.    We have reviewed her pulmonary medications in great detail.    Compliance with medications stressed.     Side effects of prescribed medications discussed with the patient    The patient DOES NOT belong to the risk group for which lung cancer screening has been recommended.      Patient was advised to continue her nasal spray, especially given improvement in symptoms overall.    Sleep study performed in July 2019  AHI was 17 / hour.   Supine AHI was 26/hour.     Latest PAP device provided in Sept 2019  DME company: [employer]    Current PAP settings: 5-12  Current mask type: Nasal    Compliance data was obtained from the her device/DME company.    Continue PAP device on a regular basis, at  least 4 hours or more per night.    Sleep hygiene measures were discussed    We will arrange NEW AutoPAP at 6-12 since her current device is more than 5 years old.     Vaccination status addressed.    Up-to-date with influenza vaccinations.     Up-to-date with pneumonia vaccinations.         Dictated utilizing Dragon dictation.    This document was electronically signed by Cathy Cheek MD on 04/16/25 at 15:01 EDT

## 2025-04-29 ENCOUNTER — LAB (OUTPATIENT)
Dept: LAB | Facility: HOSPITAL | Age: 80
End: 2025-04-29
Payer: MEDICARE

## 2025-04-29 ENCOUNTER — TRANSCRIBE ORDERS (OUTPATIENT)
Dept: LAB | Facility: HOSPITAL | Age: 80
End: 2025-04-29
Payer: MEDICARE

## 2025-04-29 DIAGNOSIS — R73.03 DIABETES MELLITUS, LATENT: ICD-10-CM

## 2025-04-29 DIAGNOSIS — E78.5 HYPERLIPIDEMIA, UNSPECIFIED HYPERLIPIDEMIA TYPE: Primary | ICD-10-CM

## 2025-04-29 DIAGNOSIS — E78.5 HYPERLIPIDEMIA, UNSPECIFIED HYPERLIPIDEMIA TYPE: ICD-10-CM

## 2025-04-29 PROCEDURE — 80061 LIPID PANEL: CPT

## 2025-04-29 PROCEDURE — 80053 COMPREHEN METABOLIC PANEL: CPT

## 2025-04-29 PROCEDURE — 36415 COLL VENOUS BLD VENIPUNCTURE: CPT

## 2025-04-29 PROCEDURE — 83036 HEMOGLOBIN GLYCOSYLATED A1C: CPT

## 2025-04-29 PROCEDURE — 85025 COMPLETE CBC W/AUTO DIFF WBC: CPT

## 2025-04-30 LAB
ALBUMIN SERPL-MCNC: 4.1 G/DL (ref 3.5–5.2)
ALBUMIN/GLOB SERPL: 1.4 G/DL
ALP SERPL-CCNC: 93 U/L (ref 39–117)
ALT SERPL W P-5'-P-CCNC: 22 U/L (ref 1–33)
ANION GAP SERPL CALCULATED.3IONS-SCNC: 13 MMOL/L (ref 5–15)
AST SERPL-CCNC: 33 U/L (ref 1–32)
BASOPHILS # BLD AUTO: 0.03 10*3/MM3 (ref 0–0.2)
BASOPHILS NFR BLD AUTO: 0.4 % (ref 0–1.5)
BILIRUB SERPL-MCNC: 0.7 MG/DL (ref 0–1.2)
BUN SERPL-MCNC: 9 MG/DL (ref 8–23)
BUN/CREAT SERPL: 8.4 (ref 7–25)
CALCIUM SPEC-SCNC: 9.8 MG/DL (ref 8.6–10.5)
CHLORIDE SERPL-SCNC: 92 MMOL/L (ref 98–107)
CHOLEST SERPL-MCNC: 96 MG/DL (ref 0–200)
CO2 SERPL-SCNC: 23 MMOL/L (ref 22–29)
CREAT SERPL-MCNC: 1.07 MG/DL (ref 0.57–1)
DEPRECATED RDW RBC AUTO: 42.3 FL (ref 37–54)
EGFRCR SERPLBLD CKD-EPI 2021: 52.6 ML/MIN/1.73
EOSINOPHIL # BLD AUTO: 0.22 10*3/MM3 (ref 0–0.4)
EOSINOPHIL NFR BLD AUTO: 2.9 % (ref 0.3–6.2)
ERYTHROCYTE [DISTWIDTH] IN BLOOD BY AUTOMATED COUNT: 13.3 % (ref 12.3–15.4)
GLOBULIN UR ELPH-MCNC: 2.9 GM/DL
GLUCOSE SERPL-MCNC: 88 MG/DL (ref 65–99)
HBA1C MFR BLD: 6.1 % (ref 4.8–5.6)
HCT VFR BLD AUTO: 36.7 % (ref 34–46.6)
HDLC SERPL-MCNC: 48 MG/DL (ref 40–60)
HGB BLD-MCNC: 12.2 G/DL (ref 12–15.9)
IMM GRANULOCYTES # BLD AUTO: 0.03 10*3/MM3 (ref 0–0.05)
IMM GRANULOCYTES NFR BLD AUTO: 0.4 % (ref 0–0.5)
LDLC SERPL CALC-MCNC: 29 MG/DL (ref 0–100)
LDLC/HDLC SERPL: 0.58 {RATIO}
LYMPHOCYTES # BLD AUTO: 2.41 10*3/MM3 (ref 0.7–3.1)
LYMPHOCYTES NFR BLD AUTO: 32.3 % (ref 19.6–45.3)
MCH RBC QN AUTO: 29.2 PG (ref 26.6–33)
MCHC RBC AUTO-ENTMCNC: 33.2 G/DL (ref 31.5–35.7)
MCV RBC AUTO: 87.8 FL (ref 79–97)
MONOCYTES # BLD AUTO: 0.58 10*3/MM3 (ref 0.1–0.9)
MONOCYTES NFR BLD AUTO: 7.8 % (ref 5–12)
NEUTROPHILS NFR BLD AUTO: 4.19 10*3/MM3 (ref 1.7–7)
NEUTROPHILS NFR BLD AUTO: 56.2 % (ref 42.7–76)
NRBC BLD AUTO-RTO: 0 /100 WBC (ref 0–0.2)
PLATELET # BLD AUTO: 349 10*3/MM3 (ref 140–450)
PMV BLD AUTO: 11 FL (ref 6–12)
POTASSIUM SERPL-SCNC: 4.6 MMOL/L (ref 3.5–5.2)
PROT SERPL-MCNC: 7 G/DL (ref 6–8.5)
RBC # BLD AUTO: 4.18 10*6/MM3 (ref 3.77–5.28)
SODIUM SERPL-SCNC: 128 MMOL/L (ref 136–145)
TRIGL SERPL-MCNC: 101 MG/DL (ref 0–150)
VLDLC SERPL-MCNC: 19 MG/DL (ref 5–40)
WBC NRBC COR # BLD AUTO: 7.46 10*3/MM3 (ref 3.4–10.8)

## 2025-07-03 ENCOUNTER — TELEPHONE (OUTPATIENT)
Dept: PULMONOLOGY | Facility: CLINIC | Age: 80
End: 2025-07-03

## 2025-07-03 ENCOUNTER — OFFICE VISIT (OUTPATIENT)
Dept: PULMONOLOGY | Facility: CLINIC | Age: 80
End: 2025-07-03
Payer: MEDICARE

## 2025-07-03 VITALS
SYSTOLIC BLOOD PRESSURE: 122 MMHG | WEIGHT: 155 LBS | HEART RATE: 63 BPM | OXYGEN SATURATION: 94 % | HEIGHT: 67 IN | RESPIRATION RATE: 18 BRPM | BODY MASS INDEX: 24.33 KG/M2 | DIASTOLIC BLOOD PRESSURE: 80 MMHG

## 2025-07-03 DIAGNOSIS — Z87.891 PERSONAL HISTORY OF NICOTINE DEPENDENCE: ICD-10-CM

## 2025-07-03 DIAGNOSIS — J44.9 CHRONIC OBSTRUCTIVE PULMONARY DISEASE, UNSPECIFIED COPD TYPE: Primary | ICD-10-CM

## 2025-07-03 DIAGNOSIS — J30.89 OTHER ALLERGIC RHINITIS: ICD-10-CM

## 2025-07-03 DIAGNOSIS — G47.33 OBSTRUCTIVE SLEEP APNEA: ICD-10-CM

## 2025-07-03 PROCEDURE — 99214 OFFICE O/P EST MOD 30 MIN: CPT | Performed by: NURSE PRACTITIONER

## 2025-07-03 RX ORDER — IPRATROPIUM BROMIDE AND ALBUTEROL SULFATE 2.5; .5 MG/3ML; MG/3ML
3 SOLUTION RESPIRATORY (INHALATION) EVERY 4 HOURS PRN
Qty: 360 ML | Refills: 1 | Status: SHIPPED | OUTPATIENT
Start: 2025-07-03

## 2025-07-03 RX ORDER — MONTELUKAST SODIUM 10 MG/1
10 TABLET ORAL NIGHTLY
Qty: 90 TABLET | Refills: 2 | Status: SHIPPED | OUTPATIENT
Start: 2025-07-03

## 2025-07-03 RX ORDER — ALBUTEROL SULFATE 90 UG/1
2 INHALANT RESPIRATORY (INHALATION) EVERY 6 HOURS PRN
Qty: 18 G | Refills: 5 | Status: SHIPPED | OUTPATIENT
Start: 2025-07-03

## 2025-07-03 RX ORDER — IPRATROPIUM BROMIDE AND ALBUTEROL SULFATE 2.5; .5 MG/3ML; MG/3ML
3 SOLUTION RESPIRATORY (INHALATION) EVERY 4 HOURS PRN
Qty: 360 ML | Refills: 1 | Status: SHIPPED | OUTPATIENT
Start: 2025-07-03 | End: 2025-07-03 | Stop reason: SDUPTHER

## 2025-07-03 NOTE — TELEPHONE ENCOUNTER
Pharmacy Name:  [facility]     Pharmacy representative name: DAVID     Pharmacy representative phone number: [phone]    What medication are you calling in regards to: NEBULIZER SOLUTION, DUONEB    What question does the pharmacy have: THEY NEED A DIAGNOSIS CODE IN ORDER TO BILL INSURANCE     Who is the provider that prescribed the medication: JUAN MANUEL BUSBY

## 2025-07-03 NOTE — PROGRESS NOTES
"Chief Complaint   Patient presents with    Breathing Problem    Follow-up         Subjective   Ijeoma Palacios is a 79 y.o. female.   Patient comes today for follow up of shortness of breath and COPD.     Symptoms have been stable since the last clinic visit. Patient reports no recent exacerbations.     Patient is using medications, as prescribed. She uses Trelegy daily. She uses ODETTE 1-3 times a day just depending on the day. She uses duoneb more in fall and winter months.     She has more SOB in cold weather and hot weather. Walking any incline causes more SOB.    She uses flonase as needed.     She takes singulair and zyrtec nightly.     Exercise tolerance has also remained stable.     Quit smoking 6 years ago.     She uses PAP therapy nightly with oxygen bled into machine. She feels more rested rested with the machine.       The following portions of the patient's history were reviewed and updated as appropriate: allergies, current medications, past family history, past medical history, past social history, and past surgical history.      Review of Systems   HENT:  Positive for sinus pressure.    Respiratory:  Positive for shortness of breath.    Psychiatric/Behavioral:  Negative for sleep disturbance.        Objective   Visit Vitals  /80   Pulse 63   Resp 18   Ht 170.2 cm (67\")   Wt 70.3 kg (155 lb)   SpO2 94%   BMI 24.28 kg/m²       Physical Exam  Vitals reviewed.   HENT:      Head: Atraumatic.      Mouth/Throat:      Mouth: Mucous membranes are moist.      Comments: Crowded oropharynx.   Eyes:      Extraocular Movements: Extraocular movements intact.   Cardiovascular:      Rate and Rhythm: Normal rate and regular rhythm.   Pulmonary:      Effort: Pulmonary effort is normal.      Comments: Surprisingly good A/E without wheezing.   Skin:     General: Skin is warm.   Neurological:      Mental Status: She is alert and oriented to person, place, and time.           Assessment & Plan   Diagnoses and all orders " for this visit:    1. Chronic obstructive pulmonary disease, unspecified COPD type (Primary)    2. Obstructive sleep apnea    3. Personal history of nicotine dependence    4. Other allergic rhinitis    Other orders  -     albuterol sulfate  (90 Base) MCG/ACT inhaler; Inhale 2 puffs Every 6 (Six) Hours As Needed for Wheezing.  Dispense: 18 g; Refill: 5  -     Fluticasone-Umeclidin-Vilant (TRELEGY) 100-62.5-25 MCG/ACT inhaler; Inhale 1 puff Daily. Rinse mouth with water after use.  Dispense: 60 each; Refill: 7  -     ipratropium-albuterol (DUO-NEB) 0.5-2.5 mg/3 ml nebulizer; Take 3 mL by nebulization Every 4 (Four) Hours As Needed for Wheezing.  Dispense: 360 mL; Refill: 1  -     montelukast (SINGULAIR) 10 MG tablet; Take 1 tablet by mouth Every Night.  Dispense: 90 tablet; Refill: 2           Return for keep appt for May.    DISCUSSION (if any):  PFT 3/2024 consistent with severe obstruction.     No change to the current medications has been made. Continue Trelegy daily and ODETTE as needed.     Continue singulair, antihistamine, and nasal spray.    Compliance with medications stressed.     Side effects of prescribed medications discussed with the patient    She no longer meets guidelines for LDCT screening.     Sleep study performed in July 2019  AHI was 17 / hour.   Supine AHI was 26/hour.      Latest PAP device provided in April 2025  DME company: [employer]     Current PAP settings: 6-12  Current mask type: Nasal mask    Continue treatment with AutoPAP at a pressure of 6-12, with a nasal mask.    Continue oxygen bled into CPAP nightly.     Patient's compliance data was reviewed and the compliance is 100%.    Humidification setup, hose and mask care discussed.    Weight loss advised.    Use every night for at least 4 hours stressed.        Dictated utilizing Dragon dictation.    This document was electronically signed by HIMANSHU Oliva July 3, 2025  13:34 EDT

## (undated) DEVICE — GW EMR FIX EXCHG J STD .035 3MM 260CM

## (undated) DEVICE — ELECTRD PAD DEFIB A/

## (undated) DEVICE — TR BAND RADIAL ARTERY COMPRESSION DEVICE: Brand: TR BAND

## (undated) DEVICE — GW THRUWAY .018 190CM

## (undated) DEVICE — SWAN-GANZ TRUE SIZE THERMODULTION CATHETER, 5F: Brand: SWAN-GANZ TRUE SIZE

## (undated) DEVICE — RADIFOCUS OPTITORQUE ANGIOGRAPHIC CATHETER: Brand: OPTITORQUE

## (undated) DEVICE — TREK CORONARY DILATATION CATHETER 2.50 MM X 12 MM / RAPID-EXCHANGE: Brand: TREK

## (undated) DEVICE — GW COUGAR XT HYDROTRACK STR TP 190CM

## (undated) DEVICE — GLIDESHEATH ACCESS KIT HYDROPHILIC COATED INTRODUCER SHEATH: Brand: GLIDESHEATH

## (undated) DEVICE — ANGIOGRAPHIC CATHETER: Brand: EXPO™

## (undated) DEVICE — GW STR PT2 MS .014CM 300CM

## (undated) DEVICE — INFLATION DEVICE: Brand: ENCORE™ 26

## (undated) DEVICE — Device